# Patient Record
Sex: FEMALE | Race: BLACK OR AFRICAN AMERICAN | NOT HISPANIC OR LATINO | Employment: FULL TIME | URBAN - METROPOLITAN AREA
[De-identification: names, ages, dates, MRNs, and addresses within clinical notes are randomized per-mention and may not be internally consistent; named-entity substitution may affect disease eponyms.]

---

## 2017-05-10 ENCOUNTER — TELEPHONE (OUTPATIENT)
Dept: INTERNAL MEDICINE | Facility: CLINIC | Age: 52
End: 2017-05-10

## 2017-05-10 ENCOUNTER — PATIENT OUTREACH (OUTPATIENT)
Dept: ADMINISTRATIVE | Facility: HOSPITAL | Age: 52
End: 2017-05-10
Payer: COMMERCIAL

## 2017-05-10 DIAGNOSIS — I10 ESSENTIAL HYPERTENSION: Primary | Chronic | ICD-10-CM

## 2017-05-10 DIAGNOSIS — Z12.31 ENCOUNTER FOR SCREENING MAMMOGRAM FOR MALIGNANT NEOPLASM OF BREAST: ICD-10-CM

## 2017-05-10 NOTE — TELEPHONE ENCOUNTER
----- Message from Onel Bustos sent at 5/10/2017 11:37 AM CDT -----  Contact: jenae  Pt is returning the Nurse staff call. Pt call back [phone] thanks

## 2017-05-10 NOTE — PROGRESS NOTES
PATIENT CALLED BACK TO SCHEDULE PAP AND MAMMO. SHE WILL TALK TO DR. RAMIREZ ABOUT GETTING COLONOSCOPY PROCESS STARTED AT [facility] ON 5/22. SHE WOULD LIKE TO HAVE PAP SMEAR PERFORMED BY JAMES VELASQUEZ ON 5/22. MAMMO SCHEDULED AT [facility] ON 5/16 AT 2:30. APPOINTMENT SLIP MAILED TO PATIENT.

## 2017-05-10 NOTE — PROGRESS NOTES
PRE VISIT CHART AUDIT  COMPLETED. LABS ENTERED PER WOG.  CALL PLACED TO PATIENT NO ANSWER. LEFT VOICE MESSAGE.

## 2017-05-22 ENCOUNTER — LAB VISIT (OUTPATIENT)
Dept: LAB | Facility: HOSPITAL | Age: 52
End: 2017-05-22
Attending: FAMILY MEDICINE
Payer: COMMERCIAL

## 2017-05-22 ENCOUNTER — OFFICE VISIT (OUTPATIENT)
Dept: INTERNAL MEDICINE | Facility: CLINIC | Age: 52
End: 2017-05-22
Payer: COMMERCIAL

## 2017-05-22 VITALS
HEIGHT: 63 IN | HEART RATE: 74 BPM | DIASTOLIC BLOOD PRESSURE: 98 MMHG | SYSTOLIC BLOOD PRESSURE: 122 MMHG | BODY MASS INDEX: 35.12 KG/M2 | WEIGHT: 198.19 LBS | TEMPERATURE: 97 F

## 2017-05-22 DIAGNOSIS — I10 ESSENTIAL HYPERTENSION: Chronic | ICD-10-CM

## 2017-05-22 DIAGNOSIS — Z11.3 SCREENING EXAMINATION FOR STD (SEXUALLY TRANSMITTED DISEASE): ICD-10-CM

## 2017-05-22 DIAGNOSIS — Z12.4 CERVICAL CANCER SCREENING: ICD-10-CM

## 2017-05-22 DIAGNOSIS — Z12.11 COLON CANCER SCREENING: ICD-10-CM

## 2017-05-22 DIAGNOSIS — Z00.00 ANNUAL PHYSICAL EXAM: Primary | ICD-10-CM

## 2017-05-22 LAB
ALBUMIN SERPL BCP-MCNC: 3.5 G/DL
ALP SERPL-CCNC: 127 U/L
ALT SERPL W/O P-5'-P-CCNC: 22 U/L
ANION GAP SERPL CALC-SCNC: 7 MMOL/L
AST SERPL-CCNC: 25 U/L
BASOPHILS # BLD AUTO: 0.04 K/UL
BASOPHILS NFR BLD: 0.5 %
BILIRUB SERPL-MCNC: 0.4 MG/DL
BUN SERPL-MCNC: 16 MG/DL
CALCIUM SERPL-MCNC: 10.1 MG/DL
CHLORIDE SERPL-SCNC: 107 MMOL/L
CHOLEST/HDLC SERPL: 3.9 {RATIO}
CO2 SERPL-SCNC: 27 MMOL/L
CREAT SERPL-MCNC: 1.1 MG/DL
DIFFERENTIAL METHOD: ABNORMAL
EOSINOPHIL # BLD AUTO: 0.2 K/UL
EOSINOPHIL NFR BLD: 2.2 %
ERYTHROCYTE [DISTWIDTH] IN BLOOD BY AUTOMATED COUNT: 14.5 %
EST. GFR  (AFRICAN AMERICAN): >60 ML/MIN/1.73 M^2
EST. GFR  (NON AFRICAN AMERICAN): 57.9 ML/MIN/1.73 M^2
GLUCOSE SERPL-MCNC: 72 MG/DL
HCT VFR BLD AUTO: 38.3 %
HDL/CHOLESTEROL RATIO: 25.6 %
HDLC SERPL-MCNC: 160 MG/DL
HDLC SERPL-MCNC: 41 MG/DL
HGB BLD-MCNC: 12.1 G/DL
LDLC SERPL CALC-MCNC: 101.8 MG/DL
LYMPHOCYTES # BLD AUTO: 3.2 K/UL
LYMPHOCYTES NFR BLD: 38 %
MCH RBC QN AUTO: 29.1 PG
MCHC RBC AUTO-ENTMCNC: 31.6 %
MCV RBC AUTO: 92 FL
MONOCYTES # BLD AUTO: 0.4 K/UL
MONOCYTES NFR BLD: 4.6 %
NEUTROPHILS # BLD AUTO: 4.6 K/UL
NEUTROPHILS NFR BLD: 54.6 %
NONHDLC SERPL-MCNC: 119 MG/DL
PLATELET # BLD AUTO: 292 K/UL
PMV BLD AUTO: 10.3 FL
POTASSIUM SERPL-SCNC: 4.8 MMOL/L
PROT SERPL-MCNC: 8.4 G/DL
RBC # BLD AUTO: 4.16 M/UL
SODIUM SERPL-SCNC: 141 MMOL/L
TRIGL SERPL-MCNC: 86 MG/DL
TSH SERPL DL<=0.005 MIU/L-ACNC: 2.13 UIU/ML
WBC # BLD AUTO: 8.32 K/UL

## 2017-05-22 PROCEDURE — 80061 LIPID PANEL: CPT

## 2017-05-22 PROCEDURE — 36415 COLL VENOUS BLD VENIPUNCTURE: CPT | Mod: PO

## 2017-05-22 PROCEDURE — 3074F SYST BP LT 130 MM HG: CPT | Mod: S$GLB,,, | Performed by: FAMILY MEDICINE

## 2017-05-22 PROCEDURE — 3080F DIAST BP >= 90 MM HG: CPT | Mod: S$GLB,,, | Performed by: FAMILY MEDICINE

## 2017-05-22 PROCEDURE — 99396 PREV VISIT EST AGE 40-64: CPT | Mod: S$GLB,,, | Performed by: FAMILY MEDICINE

## 2017-05-22 PROCEDURE — 84443 ASSAY THYROID STIM HORMONE: CPT

## 2017-05-22 PROCEDURE — 80053 COMPREHEN METABOLIC PANEL: CPT

## 2017-05-22 PROCEDURE — 90715 TDAP VACCINE 7 YRS/> IM: CPT | Mod: S$GLB,,, | Performed by: FAMILY MEDICINE

## 2017-05-22 PROCEDURE — 85025 COMPLETE CBC W/AUTO DIFF WBC: CPT

## 2017-05-22 PROCEDURE — 86703 HIV-1/HIV-2 1 RESULT ANTBDY: CPT

## 2017-05-22 PROCEDURE — 88175 CYTOPATH C/V AUTO FLUID REDO: CPT

## 2017-05-22 PROCEDURE — 99999 PR PBB SHADOW E&M-EST. PATIENT-LVL III: CPT | Mod: PBBFAC,,, | Performed by: FAMILY MEDICINE

## 2017-05-22 PROCEDURE — 86592 SYPHILIS TEST NON-TREP QUAL: CPT

## 2017-05-22 PROCEDURE — 80074 ACUTE HEPATITIS PANEL: CPT

## 2017-05-22 PROCEDURE — 87591 N.GONORRHOEAE DNA AMP PROB: CPT

## 2017-05-22 PROCEDURE — 90471 IMMUNIZATION ADMIN: CPT | Mod: S$GLB,,, | Performed by: FAMILY MEDICINE

## 2017-05-22 RX ORDER — CYCLOBENZAPRINE HCL 10 MG
10 TABLET ORAL 3 TIMES DAILY PRN
Qty: 30 TABLET | Refills: 0 | Status: SHIPPED | OUTPATIENT
Start: 2017-05-22 | End: 2017-06-01

## 2017-05-22 RX ORDER — NAPROXEN 500 MG/1
500 TABLET ORAL 2 TIMES DAILY PRN
Qty: 45 TABLET | Refills: 0 | Status: SHIPPED | OUTPATIENT
Start: 2017-05-22 | End: 2018-03-07 | Stop reason: SDUPTHER

## 2017-05-22 RX ORDER — VALSARTAN AND HYDROCHLOROTHIAZIDE 320; 25 MG/1; MG/1
1 TABLET, FILM COATED ORAL DAILY
Qty: 30 TABLET | Refills: 11 | Status: SHIPPED | OUTPATIENT
Start: 2017-05-22 | End: 2018-02-26 | Stop reason: SDUPTHER
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
## 2017-05-23 LAB
C TRACH DNA SPEC QL NAA+PROBE: NOT DETECTED
HAV IGM SERPL QL IA: NEGATIVE
HBV CORE IGM SERPL QL IA: NEGATIVE
HBV SURFACE AG SERPL QL IA: NEGATIVE
HCV AB SERPL QL IA: NEGATIVE
HIV 1+2 AB+HIV1 P24 AG SERPL QL IA: NEGATIVE
N GONORRHOEA DNA SPEC QL NAA+PROBE: NOT DETECTED
RPR SER QL: NORMAL

## 2017-05-25 ENCOUNTER — TELEPHONE (OUTPATIENT)
Dept: INTERNAL MEDICINE | Facility: CLINIC | Age: 52
End: 2017-05-25

## 2017-05-25 RX ORDER — METRONIDAZOLE 500 MG/1
1000 TABLET ORAL EVERY 12 HOURS
Qty: 4 TABLET | Refills: 0 | Status: SHIPPED | OUTPATIENT
Start: 2017-05-25 | End: 2017-05-26

## 2017-05-25 NOTE — TELEPHONE ENCOUNTER
Pap is normal, but there is evidence of trichomonas present.  I want to treat this with Abx.  I will be sending it in.  Recommend she f/u in 4 wks and make sure it is one.  Avoid sexual activity until tthen.

## 2017-05-25 NOTE — TELEPHONE ENCOUNTER
----- Message from Xiang Asher MD sent at 5/23/2017  4:36 PM CDT -----  STD screening is normal  Overall labs are good/sugar/kidneys/liver function as well as thyroid and cholesterol.

## 2017-06-01 RX ORDER — SODIUM, POTASSIUM,MAG SULFATES 17.5-3.13G
SOLUTION, RECONSTITUTED, ORAL ORAL
Qty: 354 ML | Refills: 0 | Status: SHIPPED | OUTPATIENT
Start: 2017-06-01 | End: 2018-06-11

## 2018-02-26 RX ORDER — VALSARTAN AND HYDROCHLOROTHIAZIDE 320; 25 MG/1; MG/1
1 TABLET, FILM COATED ORAL DAILY
Qty: 30 TABLET | Refills: 3 | Status: SHIPPED | OUTPATIENT
Start: 2018-02-26 | End: 2018-03-07

## 2018-02-26 RX ORDER — VALSARTAN AND HYDROCHLOROTHIAZIDE 320; 25 MG/1; MG/1
TABLET, FILM COATED ORAL
Qty: 30 TABLET | Refills: 2 | Status: SHIPPED | OUTPATIENT
Start: 2018-02-26 | End: 2018-08-30 | Stop reason: RX

## 2018-03-07 ENCOUNTER — OFFICE VISIT (OUTPATIENT)
Dept: INTERNAL MEDICINE | Facility: CLINIC | Age: 53
End: 2018-03-07
Payer: COMMERCIAL

## 2018-03-07 VITALS
BODY MASS INDEX: 35.2 KG/M2 | DIASTOLIC BLOOD PRESSURE: 80 MMHG | SYSTOLIC BLOOD PRESSURE: 100 MMHG | HEIGHT: 63 IN | WEIGHT: 198.63 LBS | TEMPERATURE: 97 F | HEART RATE: 62 BPM

## 2018-03-07 DIAGNOSIS — M79.641 RIGHT HAND PAIN: ICD-10-CM

## 2018-03-07 DIAGNOSIS — M54.2 CERVICALGIA: Primary | ICD-10-CM

## 2018-03-07 DIAGNOSIS — Z29.9 PREVENTIVE MEASURE: ICD-10-CM

## 2018-03-07 DIAGNOSIS — Z12.11 COLON CANCER SCREENING: ICD-10-CM

## 2018-03-07 DIAGNOSIS — Z12.39 BREAST CANCER SCREENING: ICD-10-CM

## 2018-03-07 DIAGNOSIS — I10 HYPERTENSION, UNSPECIFIED TYPE: ICD-10-CM

## 2018-03-07 PROCEDURE — 3079F DIAST BP 80-89 MM HG: CPT | Mod: S$GLB,,, | Performed by: FAMILY MEDICINE

## 2018-03-07 PROCEDURE — 99999 PR PBB SHADOW E&M-EST. PATIENT-LVL III: CPT | Mod: PBBFAC,,, | Performed by: FAMILY MEDICINE

## 2018-03-07 PROCEDURE — 99214 OFFICE O/P EST MOD 30 MIN: CPT | Mod: S$GLB,,, | Performed by: FAMILY MEDICINE

## 2018-03-07 PROCEDURE — 3074F SYST BP LT 130 MM HG: CPT | Mod: S$GLB,,, | Performed by: FAMILY MEDICINE

## 2018-03-07 RX ORDER — TIZANIDINE 4 MG/1
4 TABLET ORAL 3 TIMES DAILY PRN
Qty: 20 TABLET | Refills: 0 | Status: SHIPPED | OUTPATIENT
Start: 2018-03-07 | End: 2018-03-17

## 2018-03-07 RX ORDER — NAPROXEN 500 MG/1
500 TABLET ORAL 2 TIMES DAILY PRN
Qty: 45 TABLET | Refills: 0 | Status: SHIPPED | OUTPATIENT
Start: 2018-03-07 | End: 2018-11-12
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
## 2018-03-12 ENCOUNTER — DOCUMENTATION ONLY (OUTPATIENT)
Dept: ENDOSCOPY | Facility: HOSPITAL | Age: 53
End: 2018-03-12

## 2018-04-02 ENCOUNTER — HOSPITAL ENCOUNTER (OUTPATIENT)
Dept: RADIOLOGY | Facility: HOSPITAL | Age: 53
Discharge: HOME | End: 2018-04-02
Attending: FAMILY MEDICINE
Payer: COMMERCIAL

## 2018-04-02 VITALS — HEIGHT: 63 IN | WEIGHT: 198 LBS | BODY MASS INDEX: 35.08 KG/M2

## 2018-04-02 DIAGNOSIS — Z12.39 BREAST CANCER SCREENING: ICD-10-CM

## 2018-04-02 PROCEDURE — 77063 BREAST TOMOSYNTHESIS BI: CPT | Mod: 26,,, | Performed by: RADIOLOGY

## 2018-04-02 PROCEDURE — 77067 SCR MAMMO BI INCL CAD: CPT | Mod: 26,,, | Performed by: RADIOLOGY

## 2018-04-02 PROCEDURE — 77067 SCR MAMMO BI INCL CAD: CPT | Mod: TC,PO

## 2018-06-04 ENCOUNTER — LAB VISIT (OUTPATIENT)
Dept: LAB | Facility: HOSPITAL | Age: 53
End: 2018-06-04
Attending: FAMILY MEDICINE
Payer: COMMERCIAL

## 2018-06-04 DIAGNOSIS — Z29.9 PREVENTIVE MEASURE: ICD-10-CM

## 2018-06-04 LAB
ALBUMIN SERPL BCP-MCNC: 3.4 G/DL
ALP SERPL-CCNC: 113 U/L
ALT SERPL W/O P-5'-P-CCNC: 19 U/L
ANION GAP SERPL CALC-SCNC: 10 MMOL/L
AST SERPL-CCNC: 20 U/L
BASOPHILS # BLD AUTO: 0.05 K/UL
BASOPHILS NFR BLD: 0.7 %
BILIRUB SERPL-MCNC: 0.4 MG/DL
BUN SERPL-MCNC: 17 MG/DL
CALCIUM SERPL-MCNC: 9.6 MG/DL
CHLORIDE SERPL-SCNC: 107 MMOL/L
CHOLEST SERPL-MCNC: 156 MG/DL
CHOLEST/HDLC SERPL: 4.2 {RATIO}
CO2 SERPL-SCNC: 26 MMOL/L
CREAT SERPL-MCNC: 1.3 MG/DL
DIFFERENTIAL METHOD: ABNORMAL
EOSINOPHIL # BLD AUTO: 0.2 K/UL
EOSINOPHIL NFR BLD: 2.3 %
ERYTHROCYTE [DISTWIDTH] IN BLOOD BY AUTOMATED COUNT: 14.4 %
EST. GFR  (AFRICAN AMERICAN): 54.1 ML/MIN/1.73 M^2
EST. GFR  (NON AFRICAN AMERICAN): 47 ML/MIN/1.73 M^2
ESTIMATED AVG GLUCOSE: 126 MG/DL
GLUCOSE SERPL-MCNC: 143 MG/DL
HBA1C MFR BLD HPLC: 6 %
HCT VFR BLD AUTO: 35.4 %
HDLC SERPL-MCNC: 37 MG/DL
HDLC SERPL: 23.7 %
HGB BLD-MCNC: 11 G/DL
IMM GRANULOCYTES # BLD AUTO: 0.05 K/UL
IMM GRANULOCYTES NFR BLD AUTO: 0.7 %
LDLC SERPL CALC-MCNC: 103.4 MG/DL
LYMPHOCYTES # BLD AUTO: 2.6 K/UL
LYMPHOCYTES NFR BLD: 34 %
MCH RBC QN AUTO: 30.5 PG
MCHC RBC AUTO-ENTMCNC: 31.1 G/DL
MCV RBC AUTO: 98 FL
MONOCYTES # BLD AUTO: 0.4 K/UL
MONOCYTES NFR BLD: 5.3 %
NEUTROPHILS # BLD AUTO: 4.3 K/UL
NEUTROPHILS NFR BLD: 57 %
NONHDLC SERPL-MCNC: 119 MG/DL
NRBC BLD-RTO: 0 /100 WBC
PLATELET # BLD AUTO: 258 K/UL
PMV BLD AUTO: 10.6 FL
POTASSIUM SERPL-SCNC: 4.4 MMOL/L
PROT SERPL-MCNC: 7.6 G/DL
RBC # BLD AUTO: 3.61 M/UL
SODIUM SERPL-SCNC: 143 MMOL/L
TRIGL SERPL-MCNC: 78 MG/DL
TSH SERPL DL<=0.005 MIU/L-ACNC: 1.68 UIU/ML
WBC # BLD AUTO: 7.49 K/UL

## 2018-06-04 PROCEDURE — 80053 COMPREHEN METABOLIC PANEL: CPT

## 2018-06-04 PROCEDURE — 36415 COLL VENOUS BLD VENIPUNCTURE: CPT | Mod: PO

## 2018-06-04 PROCEDURE — 84443 ASSAY THYROID STIM HORMONE: CPT

## 2018-06-04 PROCEDURE — 83036 HEMOGLOBIN GLYCOSYLATED A1C: CPT

## 2018-06-04 PROCEDURE — 80074 ACUTE HEPATITIS PANEL: CPT

## 2018-06-04 PROCEDURE — 86592 SYPHILIS TEST NON-TREP QUAL: CPT

## 2018-06-04 PROCEDURE — 85025 COMPLETE CBC W/AUTO DIFF WBC: CPT

## 2018-06-04 PROCEDURE — 80061 LIPID PANEL: CPT

## 2018-06-04 PROCEDURE — 86703 HIV-1/HIV-2 1 RESULT ANTBDY: CPT

## 2018-06-05 LAB
HAV IGM SERPL QL IA: NEGATIVE
HBV CORE IGM SERPL QL IA: NEGATIVE
HBV SURFACE AG SERPL QL IA: NEGATIVE
HCV AB SERPL QL IA: NEGATIVE
HIV 1+2 AB+HIV1 P24 AG SERPL QL IA: NEGATIVE
RPR SER QL: NORMAL

## 2018-06-11 ENCOUNTER — LAB VISIT (OUTPATIENT)
Dept: LAB | Facility: HOSPITAL | Age: 53
End: 2018-06-11
Attending: FAMILY MEDICINE
Payer: COMMERCIAL

## 2018-06-11 ENCOUNTER — OFFICE VISIT (OUTPATIENT)
Dept: INTERNAL MEDICINE | Facility: CLINIC | Age: 53
End: 2018-06-11
Payer: COMMERCIAL

## 2018-06-11 VITALS
TEMPERATURE: 99 F | SYSTOLIC BLOOD PRESSURE: 116 MMHG | WEIGHT: 207.44 LBS | HEART RATE: 72 BPM | DIASTOLIC BLOOD PRESSURE: 80 MMHG | BODY MASS INDEX: 36.75 KG/M2 | HEIGHT: 63 IN

## 2018-06-11 DIAGNOSIS — R73.09 ABNORMAL GLUCOSE: ICD-10-CM

## 2018-06-11 DIAGNOSIS — Z12.11 COLON CANCER SCREENING: ICD-10-CM

## 2018-06-11 DIAGNOSIS — D64.9 ANEMIA, UNSPECIFIED TYPE: ICD-10-CM

## 2018-06-11 DIAGNOSIS — I10 HYPERTENSION, UNSPECIFIED TYPE: ICD-10-CM

## 2018-06-11 DIAGNOSIS — Z00.00 ANNUAL PHYSICAL EXAM: Primary | ICD-10-CM

## 2018-06-11 LAB
BASOPHILS # BLD AUTO: 0.06 K/UL
BASOPHILS NFR BLD: 0.7 %
DIFFERENTIAL METHOD: ABNORMAL
EOSINOPHIL # BLD AUTO: 0.2 K/UL
EOSINOPHIL NFR BLD: 2.4 %
ERYTHROCYTE [DISTWIDTH] IN BLOOD BY AUTOMATED COUNT: 14.1 %
FERRITIN SERPL-MCNC: 139 NG/ML
HCT VFR BLD AUTO: 35.2 %
HGB BLD-MCNC: 10.7 G/DL
IMM GRANULOCYTES # BLD AUTO: 0.02 K/UL
IMM GRANULOCYTES NFR BLD AUTO: 0.2 %
IRON SERPL-MCNC: 58 UG/DL
LYMPHOCYTES # BLD AUTO: 2.9 K/UL
LYMPHOCYTES NFR BLD: 35.3 %
MCH RBC QN AUTO: 30.1 PG
MCHC RBC AUTO-ENTMCNC: 30.4 G/DL
MCV RBC AUTO: 99 FL
MONOCYTES # BLD AUTO: 0.6 K/UL
MONOCYTES NFR BLD: 7.1 %
NEUTROPHILS # BLD AUTO: 4.4 K/UL
NEUTROPHILS NFR BLD: 54.3 %
NRBC BLD-RTO: 0 /100 WBC
PLATELET # BLD AUTO: 265 K/UL
PMV BLD AUTO: 9.8 FL
RBC # BLD AUTO: 3.56 M/UL
SATURATED IRON: 18 %
TOTAL IRON BINDING CAPACITY: 330 UG/DL
TRANSFERRIN SERPL-MCNC: 223 MG/DL
WBC # BLD AUTO: 8.08 K/UL

## 2018-06-11 PROCEDURE — 3079F DIAST BP 80-89 MM HG: CPT | Mod: CPTII,S$GLB,, | Performed by: FAMILY MEDICINE

## 2018-06-11 PROCEDURE — 3074F SYST BP LT 130 MM HG: CPT | Mod: CPTII,S$GLB,, | Performed by: FAMILY MEDICINE

## 2018-06-11 PROCEDURE — 85025 COMPLETE CBC W/AUTO DIFF WBC: CPT

## 2018-06-11 PROCEDURE — 83540 ASSAY OF IRON: CPT

## 2018-06-11 PROCEDURE — 82728 ASSAY OF FERRITIN: CPT

## 2018-06-11 PROCEDURE — 36415 COLL VENOUS BLD VENIPUNCTURE: CPT | Mod: PO

## 2018-06-11 PROCEDURE — 99396 PREV VISIT EST AGE 40-64: CPT | Mod: S$GLB,,, | Performed by: FAMILY MEDICINE

## 2018-06-11 PROCEDURE — 99999 PR PBB SHADOW E&M-EST. PATIENT-LVL III: CPT | Mod: PBBFAC,,, | Performed by: FAMILY MEDICINE

## 2018-06-11 NOTE — PROGRESS NOTES
"Subjective:      Patient ID: Kassy Benjamin is a 52 y.o. female.    Chief Complaint: Annual Exam    HPI  53 yo female here for annual visit.  She didn't do PT for her neck/back, planning to get to it.  Has not done colonoscopy  Not eating well/exercising.  Taking her BP med daily    Past Medical History:   Diagnosis Date    B12 deficiency anemia     Cervical radiculopathy     Goiter     Hyperlipidemia     Hypertension      Family History   Problem Relation Age of Onset    Hypertension Son     Colon cancer Mother 65    Diabetes Father     Breast cancer Neg Hx      Past Surgical History:   Procedure Laterality Date    BREAST BIOPSY Right     3/2014 at [facility]//neg     Social History   Substance Use Topics    Smoking status: Never Smoker    Smokeless tobacco: Not on file    Alcohol use No       /80 (BP Location: Left arm, Patient Position: Sitting, BP Method: Large (Manual))   Pulse 72   Temp 98.9 °F (37.2 °C) (Tympanic)   Ht 5' 3" (1.6 m)   Wt 94.1 kg (207 lb 7.3 oz)   LMP 07/25/2013   BMI 36.75 kg/m²     Review of Systems   Constitutional: Positive for activity change. Negative for appetite change, chills, diaphoresis, fatigue, fever and unexpected weight change.   HENT: Negative for ear pain, hearing loss, postnasal drip, rhinorrhea and tinnitus.    Eyes: Negative for visual disturbance.   Respiratory: Negative for cough, shortness of breath and wheezing.    Cardiovascular: Negative for chest pain, palpitations and leg swelling.   Gastrointestinal: Negative for abdominal distention, abdominal pain, constipation and diarrhea.   Genitourinary: Negative for dysuria, frequency, hematuria and urgency.   Musculoskeletal: Positive for neck pain. Negative for back pain and joint swelling.   Neurological: Negative for weakness and headaches.   Hematological: Negative for adenopathy.   Psychiatric/Behavioral: Negative for confusion and decreased concentration.       Objective:     Physical " Exam   Constitutional: She is oriented to person, place, and time. She appears well-developed and well-nourished. No distress.   HENT:   Right Ear: External ear normal.   Left Ear: External ear normal.   Nose: Nose normal.   Mouth/Throat: Oropharynx is clear and moist.   Eyes: Conjunctivae are normal. Pupils are equal, round, and reactive to light.   Neck: Normal range of motion. Neck supple. Carotid bruit is not present.   Cardiovascular: Normal rate, regular rhythm and normal heart sounds.    No murmur heard.  Pulmonary/Chest: Effort normal and breath sounds normal. No respiratory distress. She has no wheezes. She has no rales.   Abdominal: Soft. Bowel sounds are normal. She exhibits no distension. There is no tenderness. There is no guarding.   Musculoskeletal: She exhibits no edema.   Neurological: She is alert and oriented to person, place, and time. No cranial nerve deficit.   Skin: Skin is warm and dry. No rash noted.   Psychiatric: She has a normal mood and affect. Her behavior is normal. Judgment and thought content normal.   Nursing note and vitals reviewed.      Lab Results   Component Value Date    WBC 7.49 06/04/2018    HGB 11.0 (L) 06/04/2018    HCT 35.4 (L) 06/04/2018     06/04/2018    CHOL 156 06/04/2018    TRIG 78 06/04/2018    HDL 37 (L) 06/04/2018    ALT 19 06/04/2018    AST 20 06/04/2018     06/04/2018    K 4.4 06/04/2018     06/04/2018    CREATININE 1.3 06/04/2018    BUN 17 06/04/2018    CO2 26 06/04/2018    TSH 1.682 06/04/2018    HGBA1C 6.0 (H) 06/04/2018       Assessment:     1. Annual physical exam    2. Colon cancer screening    3. Anemia, unspecified type    4. Abnormal glucose    5. Hypertension, unspecified type         Plan:     Annual physical exam    Colon cancer screening  -     Case request GI: COLONOSCOPY    Anemia, unspecified type  -     CBC auto differential; Future; Expected date: 06/11/2018  -     Ferritin; Future; Expected date: 06/11/2018  -     Iron and  TIBC; Future; Expected date: 06/11/2018    Abnormal glucose  -     Hemoglobin A1c; Future; Expected date: 12/11/2018    Hypertension, unspecified type  -     Comprehensive metabolic panel; Future; Expected date: 12/11/2018    Reviewed labs with pt  Sugar is up  Focus on better diet/exericse/weight loss.    PT for neck  Update colonoscopy  BP is stable, cont current med  F/u annually and PRN

## 2018-06-13 ENCOUNTER — TELEPHONE (OUTPATIENT)
Dept: INTERNAL MEDICINE | Facility: CLINIC | Age: 53
End: 2018-06-13

## 2018-06-13 DIAGNOSIS — D75.89 MACROCYTOSIS: ICD-10-CM

## 2018-06-13 DIAGNOSIS — D64.9 ANEMIA, UNSPECIFIED TYPE: Primary | ICD-10-CM

## 2018-06-13 NOTE — TELEPHONE ENCOUNTER
Labs are stable but we didn't get a B12.  Has pt been taking any B12 supplements?  She has been deficient before and we may need to restart replacement.  I need a B12 level though, order in

## 2018-06-13 NOTE — TELEPHONE ENCOUNTER
Pt notified. She said, she hasn't been taking any B12 supplements. Lab appt scheduled for 6/18 at [facility].

## 2018-06-18 ENCOUNTER — LAB VISIT (OUTPATIENT)
Dept: LAB | Facility: HOSPITAL | Age: 53
End: 2018-06-18
Attending: FAMILY MEDICINE
Payer: COMMERCIAL

## 2018-06-18 DIAGNOSIS — D64.9 ANEMIA, UNSPECIFIED TYPE: ICD-10-CM

## 2018-06-18 DIAGNOSIS — D75.89 MACROCYTOSIS: ICD-10-CM

## 2018-06-18 LAB — VIT B12 SERPL-MCNC: 253 PG/ML

## 2018-06-18 PROCEDURE — 36415 COLL VENOUS BLD VENIPUNCTURE: CPT | Mod: PO

## 2018-06-18 PROCEDURE — 82607 VITAMIN B-12: CPT

## 2018-06-19 ENCOUNTER — TELEPHONE (OUTPATIENT)
Dept: INTERNAL MEDICINE | Facility: CLINIC | Age: 53
End: 2018-06-19

## 2018-06-19 DIAGNOSIS — E53.8 B12 DEFICIENCY: ICD-10-CM

## 2018-06-19 DIAGNOSIS — D64.9 ANEMIA, UNSPECIFIED TYPE: Primary | ICD-10-CM

## 2018-06-19 RX ORDER — CYANOCOBALAMIN 1000 UG/ML
100 INJECTION, SOLUTION INTRAMUSCULAR; SUBCUTANEOUS
Status: DISCONTINUED | OUTPATIENT
Start: 2018-06-25 | End: 2018-08-06

## 2018-06-19 NOTE — TELEPHONE ENCOUNTER
Pt called back , let her know that Dr. Asher wants to recheck her labs in 12 weeks.  Booked for 9-10-18 at [facility] location non fasting labs.    Dr. Asher said that she must do her colonoscopy before the lab appt 9-10-18.  She said she will call them to get set up.

## 2018-06-19 NOTE — TELEPHONE ENCOUNTER
Notified pt of results and recommendations. She verbalized understanding. She said, she will come in clinic for injections. Pt has scheduled appt for first injection on 6/25. Please enter order

## 2018-06-19 NOTE — TELEPHONE ENCOUNTER
----- Message from Xiang Asher MD sent at 6/19/2018  7:54 AM CDT -----  B12 is getting low again.  Does pt want to start monthly injections with the nurse, or can she do them on her own.

## 2018-06-22 ENCOUNTER — DOCUMENTATION ONLY (OUTPATIENT)
Dept: ENDOSCOPY | Facility: HOSPITAL | Age: 53
End: 2018-06-22

## 2018-06-25 ENCOUNTER — CLINICAL SUPPORT (OUTPATIENT)
Dept: INTERNAL MEDICINE | Facility: CLINIC | Age: 53
End: 2018-06-25
Payer: COMMERCIAL

## 2018-06-25 ENCOUNTER — TELEPHONE (OUTPATIENT)
Dept: INTERNAL MEDICINE | Facility: CLINIC | Age: 53
End: 2018-06-25

## 2018-06-25 DIAGNOSIS — E53.8 B12 DEFICIENCY: Primary | ICD-10-CM

## 2018-06-25 PROCEDURE — 96372 THER/PROPH/DIAG INJ SC/IM: CPT | Mod: S$GLB,,, | Performed by: FAMILY MEDICINE

## 2018-06-25 RX ADMIN — CYANOCOBALAMIN 100 MCG: 1000 INJECTION, SOLUTION INTRAMUSCULAR; SUBCUTANEOUS at 09:06

## 2018-06-25 NOTE — PROGRESS NOTES
B12 administered. Pt tolerated well. Advised pt to remain in clinic/lobby 15 minutes after injection. If no adverse/allergic reaction, may leave.

## 2018-06-25 NOTE — TELEPHONE ENCOUNTER
Pt complains of urinary incontinence whenever she laughs or sneezes. She wears liners, but she has to change them very often. She inquired about a bladder lift and if this is something that she could possibly have done? Who does she need to see?

## 2018-06-26 NOTE — TELEPHONE ENCOUNTER
Called and let pt know that Dr. Asher said she shouldn't need a referral, and can call and schedule her appt.  If they tell you that they need one she can place one for her.    I gave her Dr. Jt Shaw and Dr. Cristobal Dennis names that are listed as urogyn.  Ph # [phone]

## 2018-06-26 NOTE — TELEPHONE ENCOUNTER
She can call to make appt, shouldn't need referral there.  Recommend Uro/gyn, she can call to get name and if they require referral we can place one.

## 2018-07-23 ENCOUNTER — TELEPHONE (OUTPATIENT)
Dept: INTERNAL MEDICINE | Facility: CLINIC | Age: 53
End: 2018-07-23

## 2018-07-23 NOTE — TELEPHONE ENCOUNTER
----- Message from Aury Woods sent at 7/23/2018  7:59 AM CDT -----  Contact: self [phone]  States that she needs to reschedule her nurse visit appt for her B12 injection. Please call back at [phone]//thank you acc

## 2018-08-06 ENCOUNTER — TELEPHONE (OUTPATIENT)
Dept: INTERNAL MEDICINE | Facility: CLINIC | Age: 53
End: 2018-08-06

## 2018-08-06 RX ORDER — CYANOCOBALAMIN 1000 UG/ML
1000 INJECTION, SOLUTION INTRAMUSCULAR; SUBCUTANEOUS
Status: DISCONTINUED | OUTPATIENT
Start: 2018-08-07 | End: 2018-08-13

## 2018-08-06 NOTE — TELEPHONE ENCOUNTER
----- Message from Ayaka Jordan sent at 8/6/2018  1:00 PM CDT -----  Contact: Pt  Pt called and stated she needs to schedule a nurse's visit for a B12 shot. She can be reached at [phone].    Thanks,  TF

## 2018-08-13 ENCOUNTER — TELEPHONE (OUTPATIENT)
Dept: INTERNAL MEDICINE | Facility: CLINIC | Age: 53
End: 2018-08-13

## 2018-08-13 RX ORDER — CYANOCOBALAMIN 1000 UG/ML
100 INJECTION, SOLUTION INTRAMUSCULAR; SUBCUTANEOUS ONCE
Status: COMPLETED | OUTPATIENT
Start: 2018-08-20 | End: 2018-08-20

## 2018-08-13 NOTE — TELEPHONE ENCOUNTER
----- Message from Fadia Cullen sent at 8/13/2018 11:47 AM CDT -----  Contact: Pt   Pt request call back to cancel her Nurse Visit appointment. .[phone] (home)

## 2018-08-13 NOTE — TELEPHONE ENCOUNTER
----- Message from Alesha Villalobos sent at 8/13/2018 12:07 PM CDT -----  Contact: pt  The pt states she is returning a missed call, can be reached at [phone]///thxMW

## 2018-08-13 NOTE — TELEPHONE ENCOUNTER
Pt had to reschedule 2 nurse visits for B 12 injections. She rescheduled for 8/20/18. She is past due. Last B12 injections was 6/23/18. Can a one time order be entered to give injection on 8/20?

## 2018-08-20 ENCOUNTER — CLINICAL SUPPORT (OUTPATIENT)
Dept: INTERNAL MEDICINE | Facility: CLINIC | Age: 53
End: 2018-08-20
Payer: COMMERCIAL

## 2018-08-20 DIAGNOSIS — E53.8 B12 DEFICIENCY: Primary | ICD-10-CM

## 2018-08-20 PROCEDURE — 96372 THER/PROPH/DIAG INJ SC/IM: CPT | Mod: S$GLB,,, | Performed by: FAMILY MEDICINE

## 2018-08-20 RX ADMIN — CYANOCOBALAMIN 100 MCG: 1000 INJECTION, SOLUTION INTRAMUSCULAR; SUBCUTANEOUS at 11:08

## 2018-08-20 NOTE — PROGRESS NOTES
B12 administered. Pt tolerated well. Advised pt to remain in lobby 15 minutes after injection. If no adverse/allergic reaction, may leave

## 2018-08-30 RX ORDER — LOSARTAN POTASSIUM AND HYDROCHLOROTHIAZIDE 25; 100 MG/1; MG/1
1 TABLET ORAL DAILY
Qty: 90 TABLET | Refills: 3 | Status: SHIPPED | OUTPATIENT
Start: 2018-08-30 | End: 2018-08-31 | Stop reason: SDUPTHER

## 2018-08-30 NOTE — TELEPHONE ENCOUNTER
----- Message from Bronson Gaspar sent at 8/30/2018 10:47 AM CDT -----  Contact: pt  Pt is requesting a call back from the nurse in regards to her blood pressure med being on recall list and her needing some new med.  [phone] (home)

## 2018-08-30 NOTE — TELEPHONE ENCOUNTER
Pt called regarding valsartan-hydrochlorothiazide being on recall list and would like a new medication sent to the pharmacy.

## 2018-08-31 RX ORDER — LOSARTAN POTASSIUM AND HYDROCHLOROTHIAZIDE 25; 100 MG/1; MG/1
1 TABLET ORAL DAILY
Qty: 90 TABLET | Refills: 3 | Status: SHIPPED | OUTPATIENT
Start: 2018-08-31 | End: 2018-11-12 | Stop reason: SINTOL

## 2018-08-31 NOTE — TELEPHONE ENCOUNTER
Notified pt that  changed to losartan-hctz 100-25 mg. Advised pt to monitor bp. She verbalized understanding. She would like prescription sent to [facility] on [address]. She no longer uses [facility].

## 2018-09-10 ENCOUNTER — LAB VISIT (OUTPATIENT)
Dept: LAB | Facility: HOSPITAL | Age: 53
End: 2018-09-10
Attending: FAMILY MEDICINE
Payer: COMMERCIAL

## 2018-09-10 DIAGNOSIS — E53.8 B12 DEFICIENCY: ICD-10-CM

## 2018-09-10 DIAGNOSIS — D64.9 ANEMIA, UNSPECIFIED TYPE: ICD-10-CM

## 2018-09-10 LAB
BASOPHILS # BLD AUTO: 0.08 K/UL
BASOPHILS NFR BLD: 0.9 %
DIFFERENTIAL METHOD: ABNORMAL
EOSINOPHIL # BLD AUTO: 0.2 K/UL
EOSINOPHIL NFR BLD: 2.1 %
ERYTHROCYTE [DISTWIDTH] IN BLOOD BY AUTOMATED COUNT: 13.7 %
HCT VFR BLD AUTO: 40.9 %
HGB BLD-MCNC: 12.4 G/DL
IMM GRANULOCYTES # BLD AUTO: 0.05 K/UL
IMM GRANULOCYTES NFR BLD AUTO: 0.6 %
LYMPHOCYTES # BLD AUTO: 3.2 K/UL
LYMPHOCYTES NFR BLD: 37 %
MCH RBC QN AUTO: 29.6 PG
MCHC RBC AUTO-ENTMCNC: 30.3 G/DL
MCV RBC AUTO: 98 FL
MONOCYTES # BLD AUTO: 0.6 K/UL
MONOCYTES NFR BLD: 7.2 %
NEUTROPHILS # BLD AUTO: 4.5 K/UL
NEUTROPHILS NFR BLD: 52.2 %
NRBC BLD-RTO: 0 /100 WBC
PLATELET # BLD AUTO: 288 K/UL
PMV BLD AUTO: 10.3 FL
RBC # BLD AUTO: 4.19 M/UL
VIT B12 SERPL-MCNC: 486 PG/ML
WBC # BLD AUTO: 8.53 K/UL

## 2018-09-10 PROCEDURE — 82607 VITAMIN B-12: CPT

## 2018-09-10 PROCEDURE — 83540 ASSAY OF IRON: CPT

## 2018-09-10 PROCEDURE — 36415 COLL VENOUS BLD VENIPUNCTURE: CPT | Mod: PO

## 2018-09-10 PROCEDURE — 85025 COMPLETE CBC W/AUTO DIFF WBC: CPT

## 2018-09-10 PROCEDURE — 82728 ASSAY OF FERRITIN: CPT

## 2018-09-11 LAB
FERRITIN SERPL-MCNC: 116 NG/ML
IRON SERPL-MCNC: 44 UG/DL
SATURATED IRON: 13 %
TOTAL IRON BINDING CAPACITY: 345 UG/DL
TRANSFERRIN SERPL-MCNC: 233 MG/DL

## 2018-09-12 ENCOUNTER — TELEPHONE (OUTPATIENT)
Dept: INTERNAL MEDICINE | Facility: CLINIC | Age: 53
End: 2018-09-12

## 2018-09-12 NOTE — TELEPHONE ENCOUNTER
Notified pt of results and recommendation. She verbalized understanding. She said, she has been taking the new blood pressure medicine and has a mild headache everyday. Pt has not been monitoring her bp. Advised pt to check bp and let us know what the reading is. Explained to her that if her bp is high, that may cause a headache also. She verbalized understanding and will call back with reading.

## 2018-09-12 NOTE — TELEPHONE ENCOUNTER
----- Message from Xiang Asher MD sent at 9/11/2018  4:46 PM CDT -----  Labs improving, cont with B12 shots monthly

## 2018-09-24 ENCOUNTER — TELEPHONE (OUTPATIENT)
Dept: INTERNAL MEDICINE | Facility: CLINIC | Age: 53
End: 2018-09-24

## 2018-09-24 NOTE — TELEPHONE ENCOUNTER
----- Message from Shauna Marin sent at 9/24/2018  8:15 AM CDT -----  Contact: pt  Please call pt @ [phone], please cancel pt nurse appt for today, pt will call later to reschedule.

## 2018-09-28 ENCOUNTER — TELEPHONE (OUTPATIENT)
Dept: INTERNAL MEDICINE | Facility: CLINIC | Age: 53
End: 2018-09-28

## 2018-09-28 NOTE — TELEPHONE ENCOUNTER
----- Message from Bouchra Lerner sent at 9/28/2018  9:23 AM CDT -----  Contact: pt  Pt stated she will like a call back, she can be reached at [phone] Thanks

## 2018-09-28 NOTE — TELEPHONE ENCOUNTER
Pt complains of pelvic pain, especially when she walks. She wants to know what it is. Informed pt that she will need to be evaluated. She verbalized and requested to schedule an appt for Tuesday. appt scheduled for 10/2/18 with Neha Shen

## 2018-11-12 ENCOUNTER — OFFICE VISIT (OUTPATIENT)
Dept: INTERNAL MEDICINE | Facility: CLINIC | Age: 53
End: 2018-11-12
Payer: COMMERCIAL

## 2018-11-12 ENCOUNTER — HOSPITAL ENCOUNTER (OUTPATIENT)
Dept: RADIOLOGY | Facility: HOSPITAL | Age: 53
Discharge: HOME | End: 2018-11-12
Attending: FAMILY MEDICINE
Payer: COMMERCIAL

## 2018-11-12 VITALS
HEIGHT: 63 IN | SYSTOLIC BLOOD PRESSURE: 158 MMHG | HEART RATE: 58 BPM | BODY MASS INDEX: 37.39 KG/M2 | DIASTOLIC BLOOD PRESSURE: 90 MMHG | WEIGHT: 211 LBS | TEMPERATURE: 98 F

## 2018-11-12 DIAGNOSIS — G89.29 CHRONIC PAIN OF RIGHT KNEE: ICD-10-CM

## 2018-11-12 DIAGNOSIS — M25.561 CHRONIC PAIN OF RIGHT KNEE: ICD-10-CM

## 2018-11-12 DIAGNOSIS — G89.29 CHRONIC PAIN OF RIGHT KNEE: Primary | ICD-10-CM

## 2018-11-12 DIAGNOSIS — M54.2 CERVICALGIA: ICD-10-CM

## 2018-11-12 DIAGNOSIS — Z12.11 COLON CANCER SCREENING: ICD-10-CM

## 2018-11-12 DIAGNOSIS — I10 HYPERTENSION, UNSPECIFIED TYPE: ICD-10-CM

## 2018-11-12 DIAGNOSIS — M25.561 CHRONIC PAIN OF RIGHT KNEE: Primary | ICD-10-CM

## 2018-11-12 PROCEDURE — 73562 X-RAY EXAM OF KNEE 3: CPT | Mod: TC,FY,PO,RT

## 2018-11-12 PROCEDURE — 99214 OFFICE O/P EST MOD 30 MIN: CPT | Mod: 25,S$GLB,, | Performed by: FAMILY MEDICINE

## 2018-11-12 PROCEDURE — 3008F BODY MASS INDEX DOCD: CPT | Mod: CPTII,S$GLB,, | Performed by: FAMILY MEDICINE

## 2018-11-12 PROCEDURE — 3080F DIAST BP >= 90 MM HG: CPT | Mod: CPTII,S$GLB,, | Performed by: FAMILY MEDICINE

## 2018-11-12 PROCEDURE — 99999 PR PBB SHADOW E&M-EST. PATIENT-LVL III: CPT | Mod: PBBFAC,,, | Performed by: FAMILY MEDICINE

## 2018-11-12 PROCEDURE — 73562 X-RAY EXAM OF KNEE 3: CPT | Mod: 26,RT,, | Performed by: RADIOLOGY

## 2018-11-12 PROCEDURE — 3077F SYST BP >= 140 MM HG: CPT | Mod: CPTII,S$GLB,, | Performed by: FAMILY MEDICINE

## 2018-11-12 PROCEDURE — 73560 X-RAY EXAM OF KNEE 1 OR 2: CPT | Mod: TC,FY,PO,LT

## 2018-11-12 PROCEDURE — 73560 X-RAY EXAM OF KNEE 1 OR 2: CPT | Mod: 26,XS,LT, | Performed by: RADIOLOGY

## 2018-11-12 PROCEDURE — 96372 THER/PROPH/DIAG INJ SC/IM: CPT | Mod: S$GLB,,, | Performed by: FAMILY MEDICINE

## 2018-11-12 RX ORDER — MELOXICAM 15 MG/1
15 TABLET ORAL DAILY PRN
Qty: 30 TABLET | Refills: 3 | Status: SHIPPED | OUTPATIENT
Start: 2018-11-12 | End: 2019-04-02 | Stop reason: SDUPTHER

## 2018-11-12 RX ORDER — CYANOCOBALAMIN 1000 UG/ML
1000 INJECTION, SOLUTION INTRAMUSCULAR; SUBCUTANEOUS
Status: DISCONTINUED | OUTPATIENT
Start: 2018-11-12 | End: 2018-12-31

## 2018-11-12 RX ORDER — OLMESARTAN MEDOXOMIL AND HYDROCHLOROTHIAZIDE 40/25 40; 25 MG/1; MG/1
1 TABLET ORAL DAILY
Qty: 90 TABLET | Refills: 3 | Status: SHIPPED | OUTPATIENT
Start: 2018-11-12 | End: 2020-02-11

## 2018-11-12 RX ADMIN — CYANOCOBALAMIN 1000 MCG: 1000 INJECTION, SOLUTION INTRAMUSCULAR; SUBCUTANEOUS at 11:11

## 2018-11-12 NOTE — PROGRESS NOTES
"Subjective:      Patient ID: Kassy Benjamin is a 53 y.o. female.    Chief Complaint: Knee Pain; Arm Pain; and Shoulder Pain    HPI  52 yo female here for a few issues.  Her BP med was recalled, so we changed to Losartan/HCTZ.  This caused her ill SE.  So not taking the BP med for past several weeks.  She has R knee pain on medial side.  No trauma/injury.  Happened after walking up/down stairs.  She has chronic neck pain and pain into the L shoulder/arm.  Has not done PT yet, although we keep recommending it.  She is willing to do it.    Past Medical History:   Diagnosis Date    B12 deficiency anemia     Cervical radiculopathy     Goiter     Hyperlipidemia     Hypertension      Family History   Problem Relation Age of Onset    Hypertension Son     Colon cancer Mother 65    Diabetes Father     Breast cancer Neg Hx      Past Surgical History:   Procedure Laterality Date    BREAST BIOPSY Right     3/2014 at [facility]//neg     Social History     Tobacco Use    Smoking status: Never Smoker   Substance Use Topics    Alcohol use: No    Drug use: No       BP (!) 158/90   Pulse (!) 58   Temp 97.7 °F (36.5 °C) (Tympanic)   Ht 5' 3" (1.6 m)   Wt 95.7 kg (210 lb 15.7 oz)   LMP 07/25/2013   BMI 37.37 kg/m²     Review of Systems   Constitutional: Positive for activity change.   Musculoskeletal: Positive for arthralgias.       Objective:     Physical Exam   Constitutional: She is oriented to person, place, and time. She appears well-developed and well-nourished.   Cardiovascular: Normal rate, regular rhythm and normal heart sounds.   Pulmonary/Chest: Effort normal and breath sounds normal. No stridor. No respiratory distress.   Musculoskeletal: She exhibits no edema.        Right knee: She exhibits normal range of motion and no swelling. Tenderness found. Medial joint line tenderness noted.        Cervical back: She exhibits tenderness. She exhibits normal range of motion.   Neurological: She is alert and " oriented to person, place, and time.   Psychiatric: She has a normal mood and affect. Her behavior is normal. Judgment and thought content normal.   Nursing note and vitals reviewed.      Lab Results   Component Value Date    WBC 8.53 09/10/2018    HGB 12.4 09/10/2018    HCT 40.9 09/10/2018     09/10/2018    CHOL 156 06/04/2018    TRIG 78 06/04/2018    HDL 37 (L) 06/04/2018    ALT 19 06/04/2018    AST 20 06/04/2018     06/04/2018    K 4.4 06/04/2018     06/04/2018    CREATININE 1.3 06/04/2018    BUN 17 06/04/2018    CO2 26 06/04/2018    TSH 1.682 06/04/2018    HGBA1C 6.0 (H) 06/04/2018       Assessment:     1. Chronic pain of right knee    2. Hypertension, unspecified type    3. Cervicalgia    4. Colon cancer screening         Plan:     Chronic pain of right knee  -     Cancel: X-Ray Knee 1 or 2 View Right; Future; Expected date: 11/12/2018  -     Ambulatory Referral to Physical/Occupational Therapy    Hypertension, unspecified type    Cervicalgia  -     Ambulatory Referral to Physical/Occupational Therapy    Colon cancer screening  -     Case request GI: COLONOSCOPY    Other orders  -     meloxicam (MOBIC) 15 MG tablet; Take 1 tablet (15 mg total) by mouth daily as needed for Pain.  Dispense: 30 tablet; Refill: 3  -     olmesartan-hydrochlorothiazide (BENICAR HCT) 40-25 mg per tablet; Take 1 tablet by mouth once daily.  Dispense: 90 tablet; Refill: 3  -     cyanocobalamin injection 1,000 mcg    Xray of knee today, with some degen changes  Start mobic once daily  Start PT for neck/arm and knee  Start benicar/HCT   F/u 1 mos for BP check

## 2018-12-03 ENCOUNTER — LAB VISIT (OUTPATIENT)
Dept: LAB | Facility: HOSPITAL | Age: 53
End: 2018-12-03
Attending: FAMILY MEDICINE
Payer: COMMERCIAL

## 2018-12-03 DIAGNOSIS — R73.09 ABNORMAL GLUCOSE: ICD-10-CM

## 2018-12-03 DIAGNOSIS — I10 HYPERTENSION, UNSPECIFIED TYPE: ICD-10-CM

## 2018-12-03 LAB
ALBUMIN SERPL BCP-MCNC: 3.5 G/DL
ALP SERPL-CCNC: 117 U/L
ALT SERPL W/O P-5'-P-CCNC: 29 U/L
ANION GAP SERPL CALC-SCNC: 5 MMOL/L
AST SERPL-CCNC: 27 U/L
BILIRUB SERPL-MCNC: 0.3 MG/DL
BUN SERPL-MCNC: 16 MG/DL
CALCIUM SERPL-MCNC: 9.7 MG/DL
CHLORIDE SERPL-SCNC: 106 MMOL/L
CO2 SERPL-SCNC: 30 MMOL/L
CREAT SERPL-MCNC: 1.1 MG/DL
EST. GFR  (AFRICAN AMERICAN): >60 ML/MIN/1.73 M^2
EST. GFR  (NON AFRICAN AMERICAN): 57.1 ML/MIN/1.73 M^2
ESTIMATED AVG GLUCOSE: 123 MG/DL
GLUCOSE SERPL-MCNC: 89 MG/DL
HBA1C MFR BLD HPLC: 5.9 %
POTASSIUM SERPL-SCNC: 4.6 MMOL/L
PROT SERPL-MCNC: 7.7 G/DL
SODIUM SERPL-SCNC: 141 MMOL/L

## 2018-12-03 PROCEDURE — 36415 COLL VENOUS BLD VENIPUNCTURE: CPT | Mod: PO

## 2018-12-03 PROCEDURE — 83036 HEMOGLOBIN GLYCOSYLATED A1C: CPT

## 2018-12-03 PROCEDURE — 80053 COMPREHEN METABOLIC PANEL: CPT

## 2018-12-31 ENCOUNTER — OFFICE VISIT (OUTPATIENT)
Dept: INTERNAL MEDICINE | Facility: CLINIC | Age: 53
End: 2018-12-31
Payer: COMMERCIAL

## 2018-12-31 VITALS
DIASTOLIC BLOOD PRESSURE: 82 MMHG | TEMPERATURE: 97 F | HEART RATE: 66 BPM | BODY MASS INDEX: 37.31 KG/M2 | WEIGHT: 210.56 LBS | HEIGHT: 63 IN | SYSTOLIC BLOOD PRESSURE: 120 MMHG

## 2018-12-31 DIAGNOSIS — E53.8 B12 DEFICIENCY: ICD-10-CM

## 2018-12-31 DIAGNOSIS — E66.01 MORBID OBESITY: ICD-10-CM

## 2018-12-31 DIAGNOSIS — Z29.9 PREVENTIVE MEASURE: ICD-10-CM

## 2018-12-31 DIAGNOSIS — I10 HYPERTENSION, UNSPECIFIED TYPE: Primary | ICD-10-CM

## 2018-12-31 DIAGNOSIS — Z12.11 COLON CANCER SCREENING: ICD-10-CM

## 2018-12-31 DIAGNOSIS — R73.09 ABNORMAL GLUCOSE: ICD-10-CM

## 2018-12-31 PROCEDURE — 3079F DIAST BP 80-89 MM HG: CPT | Mod: CPTII,S$GLB,, | Performed by: FAMILY MEDICINE

## 2018-12-31 PROCEDURE — 99214 OFFICE O/P EST MOD 30 MIN: CPT | Mod: 25,S$GLB,, | Performed by: FAMILY MEDICINE

## 2018-12-31 PROCEDURE — 3074F SYST BP LT 130 MM HG: CPT | Mod: CPTII,S$GLB,, | Performed by: FAMILY MEDICINE

## 2018-12-31 PROCEDURE — 3008F BODY MASS INDEX DOCD: CPT | Mod: CPTII,S$GLB,, | Performed by: FAMILY MEDICINE

## 2018-12-31 PROCEDURE — 96372 THER/PROPH/DIAG INJ SC/IM: CPT | Mod: S$GLB,,, | Performed by: FAMILY MEDICINE

## 2018-12-31 PROCEDURE — 99999 PR PBB SHADOW E&M-EST. PATIENT-LVL III: CPT | Mod: PBBFAC,,, | Performed by: FAMILY MEDICINE

## 2018-12-31 RX ORDER — CYANOCOBALAMIN 1000 UG/ML
1000 INJECTION, SOLUTION INTRAMUSCULAR; SUBCUTANEOUS
Status: COMPLETED | OUTPATIENT
Start: 2018-12-31 | End: 2018-12-31

## 2018-12-31 RX ADMIN — CYANOCOBALAMIN 1000 MCG: 1000 INJECTION, SOLUTION INTRAMUSCULAR; SUBCUTANEOUS at 10:12

## 2018-12-31 NOTE — PROGRESS NOTES
"Subjective:      Patient ID: Kassy Benjamin is a 53 y.o. female.    Chief Complaint: Follow-up (6 mo / labs )    HPI  52 yo female here for f/u on BP, B12, sugar.  She is taking benicar and doing well with it.  Has done some B12 shots past few mos.  Stable.  Not doing PT yet for neck/joints.  Plans on maybe the new year.  Trying to stay active and focus on weight loss.    Past Medical History:   Diagnosis Date    B12 deficiency anemia     Cervical radiculopathy     Goiter     Hyperlipidemia     Hypertension      Family History   Problem Relation Age of Onset    Hypertension Son     Colon cancer Mother 65    Diabetes Father     Breast cancer Neg Hx      Past Surgical History:   Procedure Laterality Date    BREAST BIOPSY Right     3/2014 at [facility]//neg     Social History     Tobacco Use    Smoking status: Never Smoker    Smokeless tobacco: Never Used   Substance Use Topics    Alcohol use: No    Drug use: No       /82 (BP Location: Left arm, Patient Position: Sitting, BP Method: X-Large (Manual))   Pulse 66   Temp 97 °F (36.1 °C) (Tympanic)   Ht 5' 3" (1.6 m)   Wt 95.5 kg (210 lb 8.6 oz)   LMP 07/25/2013   BMI 37.30 kg/m²     Review of Systems   Constitutional: Negative for activity change, appetite change, chills, diaphoresis, fatigue, fever and unexpected weight change.   HENT: Negative for ear pain, hearing loss, postnasal drip, rhinorrhea and tinnitus.    Eyes: Negative for visual disturbance.   Respiratory: Negative for cough, shortness of breath and wheezing.    Cardiovascular: Negative for chest pain, palpitations and leg swelling.   Gastrointestinal: Negative for abdominal distention.   Genitourinary: Negative for dysuria, frequency, hematuria and urgency.   Musculoskeletal: Positive for arthralgias. Negative for back pain and joint swelling.   Neurological: Negative for weakness and headaches.   Hematological: Negative for adenopathy.   Psychiatric/Behavioral: Negative for " confusion and decreased concentration.       Objective:     Physical Exam   Constitutional: She is oriented to person, place, and time. She appears well-developed and well-nourished. No distress.   HENT:   Nose: Nose normal.   Mouth/Throat: Oropharynx is clear and moist.   Eyes: Conjunctivae are normal. Pupils are equal, round, and reactive to light.   Neck: Normal range of motion. Neck supple. Carotid bruit is not present.   Cardiovascular: Normal rate, regular rhythm and normal heart sounds.   Pulmonary/Chest: Effort normal and breath sounds normal. No respiratory distress. She has no wheezes. She has no rales.   Abdominal: Soft. Bowel sounds are normal. She exhibits no distension. There is no tenderness. There is no guarding.   Musculoskeletal: She exhibits no edema.   Neurological: She is alert and oriented to person, place, and time. No cranial nerve deficit.   Skin: Skin is warm and dry. No rash noted.   Psychiatric: She has a normal mood and affect. Her behavior is normal. Judgment and thought content normal.   Nursing note and vitals reviewed.      Lab Results   Component Value Date    WBC 8.53 09/10/2018    HGB 12.4 09/10/2018    HCT 40.9 09/10/2018     09/10/2018    CHOL 156 06/04/2018    TRIG 78 06/04/2018    HDL 37 (L) 06/04/2018    ALT 29 12/03/2018    AST 27 12/03/2018     12/03/2018    K 4.6 12/03/2018     12/03/2018    CREATININE 1.1 12/03/2018    BUN 16 12/03/2018    CO2 30 (H) 12/03/2018    TSH 1.682 06/04/2018    HGBA1C 5.9 (H) 12/03/2018       Assessment:     1. Hypertension, unspecified type    2. B12 deficiency    3. Abnormal glucose    4. Morbid obesity    5. Colon cancer screening    6. Preventive measure         Plan:     Hypertension, unspecified type    B12 deficiency    Abnormal glucose    Morbid obesity    Colon cancer screening  -     Fecal Immunochemical Test (iFOBT); Future; Expected date: 12/31/2018    Preventive measure  -     CBC auto differential; Future;  Expected date: 06/30/2019  -     Comprehensive metabolic panel; Future; Expected date: 06/30/2019  -     Hemoglobin A1c; Future; Expected date: 06/30/2019  -     Lipid panel; Future; Expected date: 06/30/2019  -     TSH; Future; Expected date: 06/30/2019  -     Vitamin D; Future; Expected date: 06/30/2019  -     Vitamin B12; Future; Expected date: 06/30/2019    Other orders  -     cyanocobalamin injection 1,000 mcg    BP controlled, cont current regimen  Glucose stable, A1C impaired.  Focus on lifestyle changes and weight loss.  Cont to monitor  B12, shot today.  Start oral meds and recheck at 6 mos  Pt has not done colonoscopy yet, will plan for future.  Do fit kit now  Declines flu shot  F/u 6 mos with labs

## 2019-01-15 ENCOUNTER — PATIENT OUTREACH (OUTPATIENT)
Dept: ADMINISTRATIVE | Facility: HOSPITAL | Age: 54
End: 2019-01-15

## 2019-02-13 ENCOUNTER — TELEPHONE (OUTPATIENT)
Dept: INTERNAL MEDICINE | Facility: CLINIC | Age: 54
End: 2019-02-13

## 2019-02-13 NOTE — TELEPHONE ENCOUNTER
Called and left message that Dr. Asher had ordered a fecal / stool test at the last appointment with her in Dec.  We have not received it back yet and need for you to collect this and drop off at nearest [facility] location.  Please call if any questions.

## 2019-04-02 RX ORDER — MELOXICAM 15 MG/1
TABLET ORAL
Qty: 30 TABLET | Refills: 3 | Status: SHIPPED | OUTPATIENT
Start: 2019-04-02 | End: 2021-01-25 | Stop reason: SDUPTHER

## 2019-07-03 ENCOUNTER — TELEPHONE (OUTPATIENT)
Dept: INTERNAL MEDICINE | Facility: CLINIC | Age: 54
End: 2019-07-03

## 2019-07-03 NOTE — TELEPHONE ENCOUNTER
Tried calling pt to reschedule her appointment 7-1-19 and labs.  Voicemail full, can not leave message.

## 2019-08-06 ENCOUNTER — TELEPHONE (OUTPATIENT)
Dept: INTERNAL MEDICINE | Facility: CLINIC | Age: 54
End: 2019-08-06

## 2019-08-30 ENCOUNTER — TELEPHONE (OUTPATIENT)
Dept: INTERNAL MEDICINE | Facility: CLINIC | Age: 54
End: 2019-08-30

## 2019-08-30 NOTE — TELEPHONE ENCOUNTER
Called and left message for pt to call back to reschedule her 6 month f/u and labs that we had to cancel as Dr. Asher was out of clinic.

## 2019-10-03 ENCOUNTER — PATIENT OUTREACH (OUTPATIENT)
Dept: ADMINISTRATIVE | Facility: HOSPITAL | Age: 54
End: 2019-10-03

## 2019-10-03 NOTE — PROGRESS NOTES
Contacted patient to follow up on overdue Colonoscopy. Patient states she hasn't had a colonoscopy, but needs to schedule one. Informed the patient that her annual will be scheduled so she can discuss everything with Dr Asher

## 2019-12-18 ENCOUNTER — PATIENT OUTREACH (OUTPATIENT)
Dept: ADMINISTRATIVE | Facility: HOSPITAL | Age: 54
End: 2019-12-18

## 2019-12-18 NOTE — PROGRESS NOTES
HM reviewed.   Immunizations abstracted.  Care Everywhere abstracted. No new results found.  Health Maintenance Due   Topic    Colonoscopy     Mammogram    Attempted to call pt to schedule overdue HM items. Left VM.  Previsit chart audit completed.  *KDL*

## 2020-02-11 RX ORDER — OLMESARTAN MEDOXOMIL AND HYDROCHLOROTHIAZIDE 40/25 40; 25 MG/1; MG/1
1 TABLET ORAL DAILY
Qty: 30 TABLET | Refills: 0 | Status: SHIPPED | OUTPATIENT
Start: 2020-02-11 | End: 2020-03-16 | Stop reason: SDUPTHER

## 2020-02-11 NOTE — TELEPHONE ENCOUNTER
Called and left message that 30 day supply of medication sent in, will need an appt with Dr. Asher for further refills as she has not been seen in a year or longer.

## 2020-03-16 ENCOUNTER — OFFICE VISIT (OUTPATIENT)
Dept: INTERNAL MEDICINE | Facility: CLINIC | Age: 55
End: 2020-03-16
Payer: COMMERCIAL

## 2020-03-16 ENCOUNTER — TELEPHONE (OUTPATIENT)
Dept: INTERNAL MEDICINE | Facility: CLINIC | Age: 55
End: 2020-03-16

## 2020-03-16 VITALS
HEART RATE: 84 BPM | RESPIRATION RATE: 16 BRPM | DIASTOLIC BLOOD PRESSURE: 74 MMHG | BODY MASS INDEX: 38.31 KG/M2 | SYSTOLIC BLOOD PRESSURE: 110 MMHG | WEIGHT: 216.25 LBS | TEMPERATURE: 98 F

## 2020-03-16 DIAGNOSIS — D50.9 IRON DEFICIENCY ANEMIA, UNSPECIFIED IRON DEFICIENCY ANEMIA TYPE: ICD-10-CM

## 2020-03-16 DIAGNOSIS — Z12.11 SCREEN FOR COLON CANCER: ICD-10-CM

## 2020-03-16 DIAGNOSIS — Z12.39 SCREENING FOR BREAST CANCER: ICD-10-CM

## 2020-03-16 DIAGNOSIS — I10 ESSENTIAL HYPERTENSION: Primary | ICD-10-CM

## 2020-03-16 PROCEDURE — 99213 PR OFFICE/OUTPT VISIT, EST, LEVL III, 20-29 MIN: ICD-10-PCS | Mod: S$GLB,,, | Performed by: NURSE PRACTITIONER

## 2020-03-16 PROCEDURE — 99213 OFFICE O/P EST LOW 20 MIN: CPT | Mod: S$GLB,,, | Performed by: NURSE PRACTITIONER

## 2020-03-16 PROCEDURE — 99999 PR PBB SHADOW E&M-EST. PATIENT-LVL III: ICD-10-PCS | Mod: PBBFAC,,, | Performed by: NURSE PRACTITIONER

## 2020-03-16 PROCEDURE — 99999 PR PBB SHADOW E&M-EST. PATIENT-LVL III: CPT | Mod: PBBFAC,,, | Performed by: NURSE PRACTITIONER

## 2020-03-16 RX ORDER — OLMESARTAN MEDOXOMIL AND HYDROCHLOROTHIAZIDE 40/25 40; 25 MG/1; MG/1
1 TABLET ORAL DAILY
Qty: 90 TABLET | Refills: 0 | Status: SHIPPED | OUTPATIENT
Start: 2020-03-16 | End: 2020-05-29

## 2020-03-16 NOTE — TELEPHONE ENCOUNTER
----- Message from Gurpreet Myers sent at 3/16/2020  8:34 AM CDT -----  Contact: self [phone]  .Type:  Sooner Apoointment Request    Caller is requesting a sooner appointment.  Caller declined first available appointment listed below.  Caller will not accept being placed on the waitlist and is requesting a message be sent to doctor.  Name of Caller:Kassy Benjamin  When is the first available appointment?04/20/20  Symptoms:check-up  Would the patient rather a call back or a response via [facility]? Call back  Best Call Back Number:[phone]  Additional Information: Pt would also like mother to worked in for same date and time

## 2020-03-16 NOTE — PROGRESS NOTES
Subjective:       Patient ID: Kassy Benjamin is a 54 y.o. female.    Chief Complaint: Medication Refill    54 year old female here requesting refill of benicar hct. Works at [employer]. Doing well. Has visit with Dr. BAER April 20. Wants med refilled until she can see Dr. BAER. I offered to do annual today, she wants to wait and see Dr. BAER next month for wellness. bp controlled. Doing well. No headache, cp, shortness of breath. No leg swelling.    Medication Refill   Pertinent negatives include no abdominal pain, chest pain, chills, coughing, diaphoresis, fatigue, fever, headaches, nausea or vomiting.       /74 (BP Location: Left arm, Patient Position: Sitting)   Pulse 84   Temp 97.5 °F (36.4 °C) (Oral)   Resp 16   Wt 98.1 kg (216 lb 4.3 oz)   LMP 07/25/2013   BMI 38.31 kg/m²     Review of Systems   Constitutional: Negative for activity change, appetite change, chills, diaphoresis, fatigue, fever and unexpected weight change.   HENT: Negative.    Eyes: Negative for visual disturbance.   Respiratory: Negative for cough, shortness of breath and wheezing.    Cardiovascular: Negative for chest pain, palpitations and leg swelling.   Gastrointestinal: Negative for abdominal distention, abdominal pain, blood in stool, constipation, diarrhea, nausea and vomiting.   Genitourinary: Negative for decreased urine volume, difficulty urinating, dysuria, frequency, hematuria and urgency.   Neurological: Negative.  Negative for dizziness, syncope, speech difficulty, light-headedness and headaches.   Psychiatric/Behavioral: Negative for agitation, confusion and hallucinations. The patient is not nervous/anxious.        Objective:      Physical Exam   Constitutional: She is oriented to person, place, and time. She appears well-developed and well-nourished. She is cooperative. No distress.   HENT:   Head: Normocephalic and atraumatic.   Eyes: Conjunctivae are normal. Right eye exhibits no discharge. Left eye exhibits  no discharge.   Cardiovascular: Normal rate, regular rhythm and normal heart sounds.   No murmur heard.  Pulmonary/Chest: Effort normal and breath sounds normal. No respiratory distress. She has no wheezes. She has no rales. She exhibits no tenderness.   Abdominal: Soft. She exhibits no distension.   Musculoskeletal: Normal range of motion.   Neurological: She is alert and oriented to person, place, and time.   Skin: Skin is warm and dry. No rash noted. She is not diaphoretic.   Psychiatric: She has a normal mood and affect. Her behavior is normal. Judgment and thought content normal.   Nursing note and vitals reviewed.      Assessment:       1. Essential hypertension    2. Screening for breast cancer    3. Screen for colon cancer    4. Iron deficiency anemia, unspecified iron deficiency anemia type        Plan:       Kassy was seen today for medication refill.    Diagnoses and all orders for this visit:    Essential hypertension  -     olmesartan-hydrochlorothiazide (BENICAR HCT) 40-25 mg per tablet; Take 1 tablet by mouth once daily.  -     CBC auto differential; Future  -     Comprehensive metabolic panel; Future  -     Lipid panel; Future  -     TSH; Future  -     Hemoglobin A1c; Future    Screening for breast cancer  -     Mammo Digital Screening Bilat; Future    Screen for colon cancer  -     Case request GI: COLONOSCOPY    Iron deficiency anemia, unspecified iron deficiency anemia type  -     Ferritin; Future  -     Iron and TIBC; Future  -     Vitamin B12; Future    med refilled per request  HM updated  Will do labs prior to 4/20 visit with Dr. BAER

## 2020-04-13 ENCOUNTER — LAB VISIT (OUTPATIENT)
Dept: LAB | Facility: HOSPITAL | Age: 55
End: 2020-04-13
Attending: NURSE PRACTITIONER
Payer: COMMERCIAL

## 2020-04-13 DIAGNOSIS — I10 ESSENTIAL HYPERTENSION: ICD-10-CM

## 2020-04-13 DIAGNOSIS — D50.9 IRON DEFICIENCY ANEMIA, UNSPECIFIED IRON DEFICIENCY ANEMIA TYPE: ICD-10-CM

## 2020-04-13 LAB
ALBUMIN SERPL BCP-MCNC: 3.7 G/DL (ref 3.5–5.2)
ALP SERPL-CCNC: 125 U/L (ref 55–135)
ALT SERPL W/O P-5'-P-CCNC: 32 U/L (ref 10–44)
ANION GAP SERPL CALC-SCNC: 8 MMOL/L (ref 8–16)
AST SERPL-CCNC: 28 U/L (ref 10–40)
BASOPHILS # BLD AUTO: 0.06 K/UL (ref 0–0.2)
BASOPHILS NFR BLD: 0.7 % (ref 0–1.9)
BILIRUB SERPL-MCNC: 0.3 MG/DL (ref 0.1–1)
BUN SERPL-MCNC: 26 MG/DL (ref 6–20)
CALCIUM SERPL-MCNC: 9.4 MG/DL (ref 8.7–10.5)
CHLORIDE SERPL-SCNC: 106 MMOL/L (ref 95–110)
CHOLEST SERPL-MCNC: 166 MG/DL (ref 120–199)
CHOLEST/HDLC SERPL: 4.2 {RATIO} (ref 2–5)
CO2 SERPL-SCNC: 26 MMOL/L (ref 23–29)
CREAT SERPL-MCNC: 1.4 MG/DL (ref 0.5–1.4)
DIFFERENTIAL METHOD: ABNORMAL
EOSINOPHIL # BLD AUTO: 0.2 K/UL (ref 0–0.5)
EOSINOPHIL NFR BLD: 2.2 % (ref 0–8)
ERYTHROCYTE [DISTWIDTH] IN BLOOD BY AUTOMATED COUNT: 14.1 % (ref 11.5–14.5)
EST. GFR  (AFRICAN AMERICAN): 48.8 ML/MIN/1.73 M^2
EST. GFR  (NON AFRICAN AMERICAN): 42.3 ML/MIN/1.73 M^2
ESTIMATED AVG GLUCOSE: 131 MG/DL (ref 68–131)
FERRITIN SERPL-MCNC: 204 NG/ML (ref 20–300)
GLUCOSE SERPL-MCNC: 81 MG/DL (ref 70–110)
HBA1C MFR BLD HPLC: 6.2 % (ref 4–5.6)
HCT VFR BLD AUTO: 36.3 % (ref 37–48.5)
HDLC SERPL-MCNC: 40 MG/DL (ref 40–75)
HDLC SERPL: 24.1 % (ref 20–50)
HGB BLD-MCNC: 11 G/DL (ref 12–16)
IMM GRANULOCYTES # BLD AUTO: 0.02 K/UL (ref 0–0.04)
IMM GRANULOCYTES NFR BLD AUTO: 0.2 % (ref 0–0.5)
IRON SERPL-MCNC: 62 UG/DL (ref 30–160)
LDLC SERPL CALC-MCNC: 108.6 MG/DL (ref 63–159)
LYMPHOCYTES # BLD AUTO: 3.6 K/UL (ref 1–4.8)
LYMPHOCYTES NFR BLD: 41.7 % (ref 18–48)
MCH RBC QN AUTO: 30.1 PG (ref 27–31)
MCHC RBC AUTO-ENTMCNC: 30.3 G/DL (ref 32–36)
MCV RBC AUTO: 100 FL (ref 82–98)
MONOCYTES # BLD AUTO: 0.5 K/UL (ref 0.3–1)
MONOCYTES NFR BLD: 5.6 % (ref 4–15)
NEUTROPHILS # BLD AUTO: 4.3 K/UL (ref 1.8–7.7)
NEUTROPHILS NFR BLD: 49.6 % (ref 38–73)
NONHDLC SERPL-MCNC: 126 MG/DL
NRBC BLD-RTO: 0 /100 WBC
PLATELET # BLD AUTO: 264 K/UL (ref 150–350)
PMV BLD AUTO: 10.1 FL (ref 9.2–12.9)
POTASSIUM SERPL-SCNC: 4.5 MMOL/L (ref 3.5–5.1)
PROT SERPL-MCNC: 8.3 G/DL (ref 6–8.4)
RBC # BLD AUTO: 3.65 M/UL (ref 4–5.4)
SATURATED IRON: 18 % (ref 20–50)
SODIUM SERPL-SCNC: 140 MMOL/L (ref 136–145)
TOTAL IRON BINDING CAPACITY: 354 UG/DL (ref 250–450)
TRANSFERRIN SERPL-MCNC: 239 MG/DL (ref 200–375)
TRIGL SERPL-MCNC: 87 MG/DL (ref 30–150)
TSH SERPL DL<=0.005 MIU/L-ACNC: 3.46 UIU/ML (ref 0.4–4)
VIT B12 SERPL-MCNC: 420 PG/ML (ref 210–950)
WBC # BLD AUTO: 8.68 K/UL (ref 3.9–12.7)

## 2020-04-13 PROCEDURE — 83036 HEMOGLOBIN GLYCOSYLATED A1C: CPT

## 2020-04-13 PROCEDURE — 80053 COMPREHEN METABOLIC PANEL: CPT

## 2020-04-13 PROCEDURE — 83540 ASSAY OF IRON: CPT

## 2020-04-13 PROCEDURE — 82607 VITAMIN B-12: CPT

## 2020-04-13 PROCEDURE — 82728 ASSAY OF FERRITIN: CPT

## 2020-04-13 PROCEDURE — 84443 ASSAY THYROID STIM HORMONE: CPT

## 2020-04-13 PROCEDURE — 85025 COMPLETE CBC W/AUTO DIFF WBC: CPT

## 2020-04-13 PROCEDURE — 36415 COLL VENOUS BLD VENIPUNCTURE: CPT | Mod: PO

## 2020-04-13 PROCEDURE — 80061 LIPID PANEL: CPT

## 2020-04-15 ENCOUNTER — TELEPHONE (OUTPATIENT)
Dept: INTERNAL MEDICINE | Facility: CLINIC | Age: 55
End: 2020-04-15

## 2020-04-15 NOTE — TELEPHONE ENCOUNTER
Tried calling pt to reschedule annual exam out 2 months that she has booked on 4- with Dr. Asher and voicemail is full.  Sent portal message.

## 2020-05-11 ENCOUNTER — PATIENT OUTREACH (OUTPATIENT)
Dept: ADMINISTRATIVE | Facility: HOSPITAL | Age: 55
End: 2020-05-11

## 2020-05-11 NOTE — PROGRESS NOTES
Patient overdue for mammogram. Called to schedule, pt agreed to 7/27/2020 same day as upcoming pcp appt. Pt also stated she would do pap smear at annual visit same day with dr amador. Noted pap in appt notes.   *KDL*

## 2020-05-21 ENCOUNTER — TELEPHONE (OUTPATIENT)
Dept: ENDOSCOPY | Facility: HOSPITAL | Age: 55
End: 2020-05-21

## 2020-05-28 DIAGNOSIS — I10 ESSENTIAL HYPERTENSION: ICD-10-CM

## 2020-05-29 RX ORDER — OLMESARTAN MEDOXOMIL AND HYDROCHLOROTHIAZIDE 40/25 40; 25 MG/1; MG/1
1 TABLET ORAL DAILY
Qty: 90 TABLET | Refills: 0 | Status: SHIPPED | OUTPATIENT
Start: 2020-05-29 | End: 2020-08-31

## 2020-11-09 ENCOUNTER — OFFICE VISIT (OUTPATIENT)
Dept: INTERNAL MEDICINE | Facility: CLINIC | Age: 55
End: 2020-11-09
Payer: COMMERCIAL

## 2020-11-09 ENCOUNTER — LAB VISIT (OUTPATIENT)
Dept: LAB | Facility: HOSPITAL | Age: 55
End: 2020-11-09
Attending: FAMILY MEDICINE
Payer: COMMERCIAL

## 2020-11-09 VITALS
WEIGHT: 215.19 LBS | BODY MASS INDEX: 38.13 KG/M2 | HEART RATE: 66 BPM | HEIGHT: 63 IN | TEMPERATURE: 99 F | SYSTOLIC BLOOD PRESSURE: 110 MMHG | DIASTOLIC BLOOD PRESSURE: 72 MMHG

## 2020-11-09 DIAGNOSIS — N28.9 ABNORMAL KIDNEY FUNCTION: ICD-10-CM

## 2020-11-09 DIAGNOSIS — Z12.4 CERVICAL CANCER SCREENING: ICD-10-CM

## 2020-11-09 DIAGNOSIS — Z12.31 ENCOUNTER FOR SCREENING MAMMOGRAM FOR MALIGNANT NEOPLASM OF BREAST: ICD-10-CM

## 2020-11-09 DIAGNOSIS — R73.09 ABNORMAL GLUCOSE: ICD-10-CM

## 2020-11-09 DIAGNOSIS — Z12.11 COLON CANCER SCREENING: ICD-10-CM

## 2020-11-09 DIAGNOSIS — Z00.00 ANNUAL PHYSICAL EXAM: Primary | ICD-10-CM

## 2020-11-09 DIAGNOSIS — I10 ESSENTIAL HYPERTENSION: ICD-10-CM

## 2020-11-09 PROCEDURE — 83036 HEMOGLOBIN GLYCOSYLATED A1C: CPT

## 2020-11-09 PROCEDURE — 36415 COLL VENOUS BLD VENIPUNCTURE: CPT | Mod: PO

## 2020-11-09 PROCEDURE — 99396 PREV VISIT EST AGE 40-64: CPT | Mod: S$GLB,,, | Performed by: FAMILY MEDICINE

## 2020-11-09 PROCEDURE — 99999 PR PBB SHADOW E&M-EST. PATIENT-LVL III: CPT | Mod: PBBFAC,,, | Performed by: FAMILY MEDICINE

## 2020-11-09 PROCEDURE — 88175 CYTOPATH C/V AUTO FLUID REDO: CPT

## 2020-11-09 PROCEDURE — 99396 PR PREVENTIVE VISIT,EST,40-64: ICD-10-PCS | Mod: S$GLB,,, | Performed by: FAMILY MEDICINE

## 2020-11-09 PROCEDURE — 80048 BASIC METABOLIC PNL TOTAL CA: CPT

## 2020-11-09 PROCEDURE — 87624 HPV HI-RISK TYP POOLED RSLT: CPT

## 2020-11-09 PROCEDURE — 99999 PR PBB SHADOW E&M-EST. PATIENT-LVL III: ICD-10-PCS | Mod: PBBFAC,,, | Performed by: FAMILY MEDICINE

## 2020-11-09 RX ORDER — OLMESARTAN MEDOXOMIL AND HYDROCHLOROTHIAZIDE 40/25 40; 25 MG/1; MG/1
1 TABLET ORAL DAILY
Qty: 90 TABLET | Refills: 3 | Status: SHIPPED | OUTPATIENT
Start: 2020-11-09 | End: 2022-02-09

## 2020-11-09 NOTE — PROGRESS NOTES
"Subjective:      Patient ID: Kassy Benjamin is a 55 y.o. female.    Chief Complaint: Annual Exam    HPI  54 yo with HTN, hyperlipidemia, abnormal glucose, obesity here for annual.  Haven't seen her in awhile.  Taking BP med  Drinking a lot of soda, trying to cut back.  Due for female exam    Past Medical History:   Diagnosis Date    B12 deficiency anemia     Cervical radiculopathy     Goiter     Hyperlipidemia     Hypertension      Family History   Problem Relation Age of Onset    Hypertension Son     Colon cancer Mother 65    Diabetes Father     Breast cancer Neg Hx      Past Surgical History:   Procedure Laterality Date    BREAST BIOPSY Right     3/2014 at [facility]//neg     Social History     Tobacco Use    Smoking status: Never Smoker    Smokeless tobacco: Never Used   Substance Use Topics    Alcohol use: No    Drug use: No       /72   Pulse 66   Temp 98.5 °F (36.9 °C) (Temporal)   Ht 5' 3" (1.6 m)   Wt 97.6 kg (215 lb 2.7 oz)   LMP 07/25/2013   BMI 38.12 kg/m²     Review of Systems   Constitutional: Negative for activity change, appetite change, chills, diaphoresis, fatigue, fever and unexpected weight change.   HENT: Negative for ear pain, hearing loss, postnasal drip, rhinorrhea and tinnitus.    Eyes: Negative for visual disturbance.   Respiratory: Negative for cough, shortness of breath and wheezing.    Cardiovascular: Negative for chest pain, palpitations and leg swelling.   Gastrointestinal: Negative for abdominal distention.   Genitourinary: Negative for dysuria, frequency, hematuria and urgency.   Musculoskeletal: Negative for back pain and joint swelling.   Neurological: Negative for weakness and headaches.   Hematological: Negative for adenopathy.   Psychiatric/Behavioral: Negative for confusion and decreased concentration.       Objective:     Physical Exam  Vitals signs and nursing note reviewed.   Constitutional:       General: She is not in acute distress.     " Appearance: She is well-developed.   HENT:      Right Ear: External ear normal.      Left Ear: External ear normal.      Nose: Nose normal.   Eyes:      Conjunctiva/sclera: Conjunctivae normal.      Pupils: Pupils are equal, round, and reactive to light.   Neck:      Musculoskeletal: Normal range of motion and neck supple.      Thyroid: No thyromegaly.      Vascular: No carotid bruit.   Cardiovascular:      Rate and Rhythm: Normal rate and regular rhythm.      Heart sounds: Normal heart sounds. No murmur. No gallop.    Pulmonary:      Effort: Pulmonary effort is normal. No respiratory distress.      Breath sounds: Normal breath sounds. No wheezing or rales.   Chest:      Breasts:         Right: Normal. No swelling, bleeding, inverted nipple, mass, nipple discharge, skin change or tenderness.         Left: Normal. No bleeding, inverted nipple, mass, nipple discharge, skin change or tenderness.   Abdominal:      General: Bowel sounds are normal. There is no distension.      Palpations: Abdomen is soft.      Tenderness: There is no abdominal tenderness. There is no guarding.   Musculoskeletal: Normal range of motion.   Lymphadenopathy:      Cervical: No cervical adenopathy.   Skin:     General: Skin is warm and dry.      Findings: No rash.   Neurological:      Mental Status: She is alert and oriented to person, place, and time.      Cranial Nerves: No cranial nerve deficit.   Psychiatric:         Behavior: Behavior normal.         Thought Content: Thought content normal.         Judgment: Judgment normal.         Lab Results   Component Value Date    WBC 8.68 04/13/2020    HGB 11.0 (L) 04/13/2020    HCT 36.3 (L) 04/13/2020     04/13/2020    CHOL 166 04/13/2020    TRIG 87 04/13/2020    HDL 40 04/13/2020    ALT 32 04/13/2020    AST 28 04/13/2020     04/13/2020    K 4.5 04/13/2020     04/13/2020    CREATININE 1.4 04/13/2020    BUN 26 (H) 04/13/2020    CO2 26 04/13/2020    TSH 3.460 04/13/2020    HGBA1C  6.2 (H) 04/13/2020       Assessment:     1. Annual physical exam    2. Abnormal glucose    3. Colon cancer screening    4. Encounter for screening mammogram for malignant neoplasm of breast    5. Abnormal kidney function    6. Cervical cancer screening    7. Essential hypertension         Plan:     Annual physical exam    Abnormal glucose  -     Hemoglobin A1C; Future; Expected date: 11/09/2020    Colon cancer screening  -     Case request GI: COLONOSCOPY    Encounter for screening mammogram for malignant neoplasm of breast  -     Mammo Digital Screening Bilat w/ Nikolai; Future; Expected date: 11/09/2020    Abnormal kidney function  -     Basic Metabolic Panel; Future; Expected date: 11/09/2020    Cervical cancer screening  -     Liquid-Based Pap Smear, Screening  -     HPV High Risk Genotypes, PCR    Essential hypertension  -     olmesartan-hydrochlorothiazide (BENICAR HCT) 40-25 mg per tablet; Take 1 tablet by mouth once daily.  Dispense: 90 tablet; Refill: 3    Reviewed prior labs  Kidney function//off recheck.  Drink more water//get off all soda  A1C too high//consider Rybelsus or Ozempic if high  Work on diet/exercise/weight loss  Bp stable/cont med  Update pap today with HPV cotest  Update Mammo  Colonoscopy order in as well  F/u to be determined after lab review

## 2020-11-10 LAB
ANION GAP SERPL CALC-SCNC: 9 MMOL/L (ref 8–16)
BUN SERPL-MCNC: 24 MG/DL (ref 6–20)
CALCIUM SERPL-MCNC: 9.5 MG/DL (ref 8.7–10.5)
CHLORIDE SERPL-SCNC: 108 MMOL/L (ref 95–110)
CO2 SERPL-SCNC: 25 MMOL/L (ref 23–29)
CREAT SERPL-MCNC: 1.3 MG/DL (ref 0.5–1.4)
EST. GFR  (AFRICAN AMERICAN): 53 ML/MIN/1.73 M^2
EST. GFR  (NON AFRICAN AMERICAN): 46 ML/MIN/1.73 M^2
ESTIMATED AVG GLUCOSE: 131 MG/DL (ref 68–131)
GLUCOSE SERPL-MCNC: 68 MG/DL (ref 70–110)
HBA1C MFR BLD HPLC: 6.2 % (ref 4–5.6)
POTASSIUM SERPL-SCNC: 4.4 MMOL/L (ref 3.5–5.1)
SODIUM SERPL-SCNC: 142 MMOL/L (ref 136–145)

## 2020-11-16 ENCOUNTER — HOSPITAL ENCOUNTER (OUTPATIENT)
Dept: RADIOLOGY | Facility: HOSPITAL | Age: 55
Discharge: HOME | End: 2020-11-16
Attending: FAMILY MEDICINE
Payer: COMMERCIAL

## 2020-11-16 DIAGNOSIS — Z12.31 ENCOUNTER FOR SCREENING MAMMOGRAM FOR MALIGNANT NEOPLASM OF BREAST: ICD-10-CM

## 2020-11-16 LAB
HPV HR 12 DNA SPEC QL NAA+PROBE: NEGATIVE
HPV16 AG SPEC QL: NEGATIVE
HPV18 DNA SPEC QL NAA+PROBE: NEGATIVE

## 2020-11-16 PROCEDURE — 77063 BREAST TOMOSYNTHESIS BI: CPT | Mod: 26,,, | Performed by: RADIOLOGY

## 2020-11-16 PROCEDURE — 77063 MAMMO DIGITAL SCREENING BILAT WITH TOMO: ICD-10-PCS | Mod: 26,,, | Performed by: RADIOLOGY

## 2020-11-16 PROCEDURE — 77067 MAMMO DIGITAL SCREENING BILAT WITH TOMO: ICD-10-PCS | Mod: 26,,, | Performed by: RADIOLOGY

## 2020-11-16 PROCEDURE — 77067 SCR MAMMO BI INCL CAD: CPT | Mod: 26,,, | Performed by: RADIOLOGY

## 2020-11-16 PROCEDURE — 77067 SCR MAMMO BI INCL CAD: CPT | Mod: TC

## 2020-11-18 ENCOUNTER — TELEPHONE (OUTPATIENT)
Dept: ENDOSCOPY | Facility: HOSPITAL | Age: 55
End: 2020-11-18

## 2020-11-18 NOTE — TELEPHONE ENCOUNTER
Attempted to schedule procedure with patient, she declined d/t being at work.  Patient stated she would call office to schedule procedure when ready.

## 2020-12-16 LAB
FINAL PATHOLOGIC DIAGNOSIS: NORMAL
Lab: NORMAL

## 2021-01-19 ENCOUNTER — TELEPHONE (OUTPATIENT)
Dept: INTERNAL MEDICINE | Facility: CLINIC | Age: 56
End: 2021-01-19

## 2021-01-25 ENCOUNTER — OFFICE VISIT (OUTPATIENT)
Dept: INTERNAL MEDICINE | Facility: CLINIC | Age: 56
End: 2021-01-25
Payer: COMMERCIAL

## 2021-01-25 VITALS
SYSTOLIC BLOOD PRESSURE: 130 MMHG | HEART RATE: 72 BPM | DIASTOLIC BLOOD PRESSURE: 78 MMHG | BODY MASS INDEX: 40.46 KG/M2 | HEIGHT: 61 IN | RESPIRATION RATE: 18 BRPM | WEIGHT: 214.31 LBS | TEMPERATURE: 98 F

## 2021-01-25 DIAGNOSIS — M79.671 RIGHT FOOT PAIN: ICD-10-CM

## 2021-01-25 DIAGNOSIS — D64.9 ANEMIA, UNSPECIFIED TYPE: ICD-10-CM

## 2021-01-25 DIAGNOSIS — R73.09 ABNORMAL GLUCOSE: ICD-10-CM

## 2021-01-25 DIAGNOSIS — I10 ESSENTIAL HYPERTENSION: ICD-10-CM

## 2021-01-25 DIAGNOSIS — R73.03 PREDIABETES: Primary | ICD-10-CM

## 2021-01-25 PROCEDURE — 99214 OFFICE O/P EST MOD 30 MIN: CPT | Mod: S$GLB,,, | Performed by: NURSE PRACTITIONER

## 2021-01-25 PROCEDURE — 99214 PR OFFICE/OUTPT VISIT, EST, LEVL IV, 30-39 MIN: ICD-10-PCS | Mod: S$GLB,,, | Performed by: NURSE PRACTITIONER

## 2021-01-25 PROCEDURE — 99999 PR PBB SHADOW E&M-EST. PATIENT-LVL III: CPT | Mod: PBBFAC,,, | Performed by: NURSE PRACTITIONER

## 2021-01-25 PROCEDURE — 99999 PR PBB SHADOW E&M-EST. PATIENT-LVL III: ICD-10-PCS | Mod: PBBFAC,,, | Performed by: NURSE PRACTITIONER

## 2021-01-25 RX ORDER — MELOXICAM 15 MG/1
15 TABLET ORAL DAILY PRN
Qty: 30 TABLET | Refills: 3 | Status: SHIPPED | OUTPATIENT
Start: 2021-01-25 | End: 2021-04-26

## 2021-01-25 RX ORDER — METFORMIN HYDROCHLORIDE 500 MG/1
500 TABLET, EXTENDED RELEASE ORAL 2 TIMES DAILY WITH MEALS
Qty: 60 TABLET | Refills: 11 | Status: SHIPPED | OUTPATIENT
Start: 2021-01-25 | End: 2022-04-21 | Stop reason: SDUPTHER

## 2021-04-19 ENCOUNTER — LAB VISIT (OUTPATIENT)
Dept: LAB | Facility: HOSPITAL | Age: 56
End: 2021-04-19
Attending: NURSE PRACTITIONER
Payer: COMMERCIAL

## 2021-04-19 DIAGNOSIS — R73.03 PREDIABETES: ICD-10-CM

## 2021-04-19 DIAGNOSIS — D64.9 ANEMIA, UNSPECIFIED TYPE: ICD-10-CM

## 2021-04-19 LAB
ALBUMIN SERPL BCP-MCNC: 3.5 G/DL (ref 3.5–5.2)
ALP SERPL-CCNC: 114 U/L (ref 55–135)
ALT SERPL W/O P-5'-P-CCNC: 34 U/L (ref 10–44)
ANION GAP SERPL CALC-SCNC: 8 MMOL/L (ref 8–16)
AST SERPL-CCNC: 27 U/L (ref 10–40)
BASOPHILS # BLD AUTO: 0.03 K/UL (ref 0–0.2)
BASOPHILS NFR BLD: 0.5 % (ref 0–1.9)
BILIRUB SERPL-MCNC: 0.3 MG/DL (ref 0.1–1)
BUN SERPL-MCNC: 21 MG/DL (ref 6–20)
CALCIUM SERPL-MCNC: 9.2 MG/DL (ref 8.7–10.5)
CHLORIDE SERPL-SCNC: 108 MMOL/L (ref 95–110)
CHOLEST SERPL-MCNC: 155 MG/DL (ref 120–199)
CHOLEST/HDLC SERPL: 4.1 {RATIO} (ref 2–5)
CO2 SERPL-SCNC: 27 MMOL/L (ref 23–29)
CREAT SERPL-MCNC: 1.2 MG/DL (ref 0.5–1.4)
DIFFERENTIAL METHOD: ABNORMAL
EOSINOPHIL # BLD AUTO: 0.1 K/UL (ref 0–0.5)
EOSINOPHIL NFR BLD: 1.8 % (ref 0–8)
ERYTHROCYTE [DISTWIDTH] IN BLOOD BY AUTOMATED COUNT: 13.8 % (ref 11.5–14.5)
EST. GFR  (AFRICAN AMERICAN): 58.4 ML/MIN/1.73 M^2
EST. GFR  (NON AFRICAN AMERICAN): 50.6 ML/MIN/1.73 M^2
ESTIMATED AVG GLUCOSE: 128 MG/DL (ref 68–131)
FERRITIN SERPL-MCNC: 162 NG/ML (ref 20–300)
GLUCOSE SERPL-MCNC: 83 MG/DL (ref 70–110)
HBA1C MFR BLD: 6.1 % (ref 4–5.6)
HCT VFR BLD AUTO: 36.7 % (ref 37–48.5)
HDLC SERPL-MCNC: 38 MG/DL (ref 40–75)
HDLC SERPL: 24.5 % (ref 20–50)
HGB BLD-MCNC: 11.2 G/DL (ref 12–16)
IMM GRANULOCYTES # BLD AUTO: 0.02 K/UL (ref 0–0.04)
IMM GRANULOCYTES NFR BLD AUTO: 0.3 % (ref 0–0.5)
INSULIN COLLECTION INTERVAL: ABNORMAL
INSULIN SERPL-ACNC: 26.4 UU/ML
IRON SERPL-MCNC: 57 UG/DL (ref 30–160)
LDLC SERPL CALC-MCNC: 95.6 MG/DL (ref 63–159)
LYMPHOCYTES # BLD AUTO: 1.9 K/UL (ref 1–4.8)
LYMPHOCYTES NFR BLD: 29.3 % (ref 18–48)
MCH RBC QN AUTO: 29.9 PG (ref 27–31)
MCHC RBC AUTO-ENTMCNC: 30.5 G/DL (ref 32–36)
MCV RBC AUTO: 98 FL (ref 82–98)
MONOCYTES # BLD AUTO: 0.4 K/UL (ref 0.3–1)
MONOCYTES NFR BLD: 5.7 % (ref 4–15)
NEUTROPHILS # BLD AUTO: 4.1 K/UL (ref 1.8–7.7)
NEUTROPHILS NFR BLD: 62.4 % (ref 38–73)
NONHDLC SERPL-MCNC: 117 MG/DL
NRBC BLD-RTO: 0 /100 WBC
PLATELET # BLD AUTO: 265 K/UL (ref 150–450)
PMV BLD AUTO: 10.1 FL (ref 9.2–12.9)
POTASSIUM SERPL-SCNC: 4.4 MMOL/L (ref 3.5–5.1)
PROT SERPL-MCNC: 7.8 G/DL (ref 6–8.4)
RBC # BLD AUTO: 3.75 M/UL (ref 4–5.4)
SATURATED IRON: 17 % (ref 20–50)
SODIUM SERPL-SCNC: 143 MMOL/L (ref 136–145)
TOTAL IRON BINDING CAPACITY: 336 UG/DL (ref 250–450)
TRANSFERRIN SERPL-MCNC: 227 MG/DL (ref 200–375)
TRIGL SERPL-MCNC: 107 MG/DL (ref 30–150)
TSH SERPL DL<=0.005 MIU/L-ACNC: 1.9 UIU/ML (ref 0.4–4)
VIT B12 SERPL-MCNC: 1617 PG/ML (ref 210–950)
WBC # BLD AUTO: 6.62 K/UL (ref 3.9–12.7)

## 2021-04-19 PROCEDURE — 80061 LIPID PANEL: CPT | Performed by: NURSE PRACTITIONER

## 2021-04-19 PROCEDURE — 85025 COMPLETE CBC W/AUTO DIFF WBC: CPT | Performed by: NURSE PRACTITIONER

## 2021-04-19 PROCEDURE — 36415 COLL VENOUS BLD VENIPUNCTURE: CPT | Mod: PO | Performed by: NURSE PRACTITIONER

## 2021-04-19 PROCEDURE — 84443 ASSAY THYROID STIM HORMONE: CPT | Performed by: NURSE PRACTITIONER

## 2021-04-19 PROCEDURE — 83036 HEMOGLOBIN GLYCOSYLATED A1C: CPT | Performed by: NURSE PRACTITIONER

## 2021-04-19 PROCEDURE — 80053 COMPREHEN METABOLIC PANEL: CPT | Performed by: NURSE PRACTITIONER

## 2021-04-19 PROCEDURE — 82607 VITAMIN B-12: CPT | Performed by: NURSE PRACTITIONER

## 2021-04-19 PROCEDURE — 83540 ASSAY OF IRON: CPT | Performed by: NURSE PRACTITIONER

## 2021-04-19 PROCEDURE — 82728 ASSAY OF FERRITIN: CPT | Performed by: NURSE PRACTITIONER

## 2021-04-19 PROCEDURE — 83525 ASSAY OF INSULIN: CPT | Performed by: NURSE PRACTITIONER

## 2021-04-26 ENCOUNTER — OFFICE VISIT (OUTPATIENT)
Dept: INTERNAL MEDICINE | Facility: CLINIC | Age: 56
End: 2021-04-26
Payer: COMMERCIAL

## 2021-04-26 ENCOUNTER — TELEPHONE (OUTPATIENT)
Dept: ADMINISTRATIVE | Facility: HOSPITAL | Age: 56
End: 2021-04-26

## 2021-04-26 VITALS
SYSTOLIC BLOOD PRESSURE: 120 MMHG | BODY MASS INDEX: 40.16 KG/M2 | HEART RATE: 64 BPM | TEMPERATURE: 98 F | WEIGHT: 204.56 LBS | DIASTOLIC BLOOD PRESSURE: 76 MMHG | HEIGHT: 60 IN

## 2021-04-26 DIAGNOSIS — R73.03 PREDIABETES: Primary | ICD-10-CM

## 2021-04-26 DIAGNOSIS — D64.9 ANEMIA, UNSPECIFIED TYPE: ICD-10-CM

## 2021-04-26 DIAGNOSIS — I10 ESSENTIAL HYPERTENSION: ICD-10-CM

## 2021-04-26 DIAGNOSIS — N18.31 STAGE 3A CHRONIC KIDNEY DISEASE: ICD-10-CM

## 2021-04-26 PROCEDURE — 99999 PR PBB SHADOW E&M-EST. PATIENT-LVL III: CPT | Mod: PBBFAC,,, | Performed by: FAMILY MEDICINE

## 2021-04-26 PROCEDURE — 99999 PR PBB SHADOW E&M-EST. PATIENT-LVL III: ICD-10-PCS | Mod: PBBFAC,,, | Performed by: FAMILY MEDICINE

## 2021-04-26 PROCEDURE — 99214 PR OFFICE/OUTPT VISIT, EST, LEVL IV, 30-39 MIN: ICD-10-PCS | Mod: S$GLB,,, | Performed by: FAMILY MEDICINE

## 2021-04-26 PROCEDURE — 99214 OFFICE O/P EST MOD 30 MIN: CPT | Mod: S$GLB,,, | Performed by: FAMILY MEDICINE

## 2021-04-28 ENCOUNTER — PATIENT MESSAGE (OUTPATIENT)
Dept: RESEARCH | Facility: HOSPITAL | Age: 56
End: 2021-04-28

## 2021-08-17 ENCOUNTER — TELEPHONE (OUTPATIENT)
Dept: INTERNAL MEDICINE | Facility: CLINIC | Age: 56
End: 2021-08-17

## 2021-08-18 ENCOUNTER — TELEPHONE (OUTPATIENT)
Dept: INTERNAL MEDICINE | Facility: CLINIC | Age: 56
End: 2021-08-18

## 2021-10-04 ENCOUNTER — OFFICE VISIT (OUTPATIENT)
Dept: INTERNAL MEDICINE | Facility: CLINIC | Age: 56
End: 2021-10-04
Payer: COMMERCIAL

## 2021-10-04 ENCOUNTER — HOSPITAL ENCOUNTER (OUTPATIENT)
Dept: RADIOLOGY | Facility: HOSPITAL | Age: 56
Discharge: HOME | End: 2021-10-04
Attending: FAMILY MEDICINE
Payer: COMMERCIAL

## 2021-10-04 VITALS
DIASTOLIC BLOOD PRESSURE: 88 MMHG | HEART RATE: 80 BPM | BODY MASS INDEX: 36.87 KG/M2 | SYSTOLIC BLOOD PRESSURE: 128 MMHG | WEIGHT: 195.31 LBS | HEIGHT: 61 IN | TEMPERATURE: 98 F

## 2021-10-04 DIAGNOSIS — Z00.00 ANNUAL PHYSICAL EXAM: Primary | ICD-10-CM

## 2021-10-04 DIAGNOSIS — M25.562 CHRONIC PAIN OF BOTH KNEES: ICD-10-CM

## 2021-10-04 DIAGNOSIS — M25.561 CHRONIC PAIN OF BOTH KNEES: ICD-10-CM

## 2021-10-04 DIAGNOSIS — Z12.31 ENCOUNTER FOR SCREENING MAMMOGRAM FOR MALIGNANT NEOPLASM OF BREAST: ICD-10-CM

## 2021-10-04 DIAGNOSIS — M79.605 BILATERAL LEG PAIN: ICD-10-CM

## 2021-10-04 DIAGNOSIS — N18.31 STAGE 3A CHRONIC KIDNEY DISEASE: ICD-10-CM

## 2021-10-04 DIAGNOSIS — G89.29 CHRONIC PAIN OF BOTH KNEES: ICD-10-CM

## 2021-10-04 DIAGNOSIS — M79.604 BILATERAL LEG PAIN: ICD-10-CM

## 2021-10-04 DIAGNOSIS — Z12.11 COLON CANCER SCREENING: ICD-10-CM

## 2021-10-04 PROCEDURE — 99999 PR PBB SHADOW E&M-EST. PATIENT-LVL III: CPT | Mod: PBBFAC,,, | Performed by: FAMILY MEDICINE

## 2021-10-04 PROCEDURE — 99999 PR PBB SHADOW E&M-EST. PATIENT-LVL III: ICD-10-PCS | Mod: PBBFAC,,, | Performed by: FAMILY MEDICINE

## 2021-10-04 PROCEDURE — 73562 XR KNEE ORTHO BILAT: ICD-10-PCS | Mod: 26,50,, | Performed by: RADIOLOGY

## 2021-10-04 PROCEDURE — 73562 X-RAY EXAM OF KNEE 3: CPT | Mod: TC,50,FY,PO

## 2021-10-04 PROCEDURE — 99214 PR OFFICE/OUTPT VISIT, EST, LEVL IV, 30-39 MIN: ICD-10-PCS | Mod: S$GLB,,, | Performed by: FAMILY MEDICINE

## 2021-10-04 PROCEDURE — 73562 X-RAY EXAM OF KNEE 3: CPT | Mod: 26,50,, | Performed by: RADIOLOGY

## 2021-10-04 PROCEDURE — 99214 OFFICE O/P EST MOD 30 MIN: CPT | Mod: S$GLB,,, | Performed by: FAMILY MEDICINE

## 2021-10-18 ENCOUNTER — LAB VISIT (OUTPATIENT)
Dept: LAB | Facility: HOSPITAL | Age: 56
End: 2021-10-18
Attending: FAMILY MEDICINE
Payer: COMMERCIAL

## 2021-10-18 DIAGNOSIS — R73.03 PREDIABETES: ICD-10-CM

## 2021-10-18 DIAGNOSIS — N18.31 STAGE 3A CHRONIC KIDNEY DISEASE: ICD-10-CM

## 2021-10-18 LAB
ANION GAP SERPL CALC-SCNC: 7 MMOL/L (ref 8–16)
BUN SERPL-MCNC: 20 MG/DL (ref 6–20)
CALCIUM SERPL-MCNC: 10.1 MG/DL (ref 8.7–10.5)
CHLORIDE SERPL-SCNC: 103 MMOL/L (ref 95–110)
CO2 SERPL-SCNC: 27 MMOL/L (ref 23–29)
CREAT SERPL-MCNC: 1.4 MG/DL (ref 0.5–1.4)
EST. GFR  (AFRICAN AMERICAN): 48.1 ML/MIN/1.73 M^2
EST. GFR  (NON AFRICAN AMERICAN): 41.7 ML/MIN/1.73 M^2
ESTIMATED AVG GLUCOSE: 117 MG/DL (ref 68–131)
GLUCOSE SERPL-MCNC: 73 MG/DL (ref 70–110)
HBA1C MFR BLD: 5.7 % (ref 4–5.6)
POTASSIUM SERPL-SCNC: 4.8 MMOL/L (ref 3.5–5.1)
SODIUM SERPL-SCNC: 137 MMOL/L (ref 136–145)

## 2021-10-18 PROCEDURE — 80048 BASIC METABOLIC PNL TOTAL CA: CPT | Performed by: FAMILY MEDICINE

## 2021-10-18 PROCEDURE — 36415 COLL VENOUS BLD VENIPUNCTURE: CPT | Mod: PO | Performed by: FAMILY MEDICINE

## 2021-10-18 PROCEDURE — 83036 HEMOGLOBIN GLYCOSYLATED A1C: CPT | Performed by: FAMILY MEDICINE

## 2021-11-22 ENCOUNTER — TELEPHONE (OUTPATIENT)
Dept: INTERNAL MEDICINE | Facility: CLINIC | Age: 56
End: 2021-11-22
Payer: COMMERCIAL

## 2021-11-22 ENCOUNTER — HOSPITAL ENCOUNTER (OUTPATIENT)
Dept: RADIOLOGY | Facility: HOSPITAL | Age: 56
Discharge: HOME | End: 2021-11-22
Attending: FAMILY MEDICINE
Payer: COMMERCIAL

## 2021-11-22 DIAGNOSIS — Z12.31 ENCOUNTER FOR SCREENING MAMMOGRAM FOR MALIGNANT NEOPLASM OF BREAST: ICD-10-CM

## 2021-11-22 PROCEDURE — 77063 BREAST TOMOSYNTHESIS BI: CPT | Mod: 26,,, | Performed by: RADIOLOGY

## 2021-11-22 PROCEDURE — 77067 SCR MAMMO BI INCL CAD: CPT | Mod: TC

## 2021-11-22 PROCEDURE — 77063 MAMMO DIGITAL SCREENING BILAT WITH TOMO: ICD-10-PCS | Mod: 26,,, | Performed by: RADIOLOGY

## 2021-11-22 PROCEDURE — 77067 SCR MAMMO BI INCL CAD: CPT | Mod: 26,,, | Performed by: RADIOLOGY

## 2021-11-22 PROCEDURE — 77067 MAMMO DIGITAL SCREENING BILAT WITH TOMO: ICD-10-PCS | Mod: 26,,, | Performed by: RADIOLOGY

## 2021-12-29 RX ORDER — SODIUM, POTASSIUM,MAG SULFATES 17.5-3.13G
1 SOLUTION, RECONSTITUTED, ORAL ORAL DAILY
Qty: 1 KIT | Refills: 0 | Status: SHIPPED | OUTPATIENT
Start: 2021-12-29 | End: 2021-12-31

## 2022-01-12 ENCOUNTER — PATIENT MESSAGE (OUTPATIENT)
Dept: ENDOSCOPY | Facility: HOSPITAL | Age: 57
End: 2022-01-12
Payer: COMMERCIAL

## 2022-01-12 RX ORDER — POLYETHYLENE GLYCOL 3350, SODIUM SULFATE ANHYDROUS, SODIUM BICARBONATE, SODIUM CHLORIDE, POTASSIUM CHLORIDE 236; 22.74; 6.74; 5.86; 2.97 G/4L; G/4L; G/4L; G/4L; G/4L
4 POWDER, FOR SOLUTION ORAL ONCE
Qty: 4000 ML | Refills: 0 | Status: SHIPPED | OUTPATIENT
Start: 2022-01-12 | End: 2022-01-12

## 2022-02-03 ENCOUNTER — PATIENT MESSAGE (OUTPATIENT)
Dept: ENDOSCOPY | Facility: HOSPITAL | Age: 57
End: 2022-02-03
Payer: COMMERCIAL

## 2022-04-21 ENCOUNTER — LAB VISIT (OUTPATIENT)
Dept: LAB | Facility: HOSPITAL | Age: 57
End: 2022-04-21
Attending: FAMILY MEDICINE
Payer: COMMERCIAL

## 2022-04-21 ENCOUNTER — OFFICE VISIT (OUTPATIENT)
Dept: INTERNAL MEDICINE | Facility: CLINIC | Age: 57
End: 2022-04-21
Payer: COMMERCIAL

## 2022-04-21 VITALS
WEIGHT: 204.13 LBS | HEART RATE: 66 BPM | BODY MASS INDEX: 38.54 KG/M2 | HEIGHT: 61 IN | TEMPERATURE: 97 F | DIASTOLIC BLOOD PRESSURE: 80 MMHG | SYSTOLIC BLOOD PRESSURE: 124 MMHG | OXYGEN SATURATION: 99 %

## 2022-04-21 DIAGNOSIS — D64.9 ANEMIA, UNSPECIFIED TYPE: ICD-10-CM

## 2022-04-21 DIAGNOSIS — R73.03 PREDIABETES: ICD-10-CM

## 2022-04-21 DIAGNOSIS — Z12.11 COLON CANCER SCREENING: ICD-10-CM

## 2022-04-21 DIAGNOSIS — R73.09 ABNORMAL GLUCOSE: ICD-10-CM

## 2022-04-21 DIAGNOSIS — I10 HYPERTENSION, UNSPECIFIED TYPE: ICD-10-CM

## 2022-04-21 DIAGNOSIS — I10 ESSENTIAL HYPERTENSION: ICD-10-CM

## 2022-04-21 DIAGNOSIS — M79.669 PAIN IN SHIN, UNSPECIFIED LATERALITY: ICD-10-CM

## 2022-04-21 DIAGNOSIS — L65.9 HAIR LOSS: Primary | ICD-10-CM

## 2022-04-21 DIAGNOSIS — L65.9 HAIR LOSS: ICD-10-CM

## 2022-04-21 PROCEDURE — 84443 ASSAY THYROID STIM HORMONE: CPT | Performed by: FAMILY MEDICINE

## 2022-04-21 PROCEDURE — 84466 ASSAY OF TRANSFERRIN: CPT | Performed by: FAMILY MEDICINE

## 2022-04-21 PROCEDURE — 99999 PR PBB SHADOW E&M-EST. PATIENT-LVL IV: ICD-10-PCS | Mod: PBBFAC,,, | Performed by: FAMILY MEDICINE

## 2022-04-21 PROCEDURE — 36415 COLL VENOUS BLD VENIPUNCTURE: CPT | Mod: PO | Performed by: FAMILY MEDICINE

## 2022-04-21 PROCEDURE — 80053 COMPREHEN METABOLIC PANEL: CPT | Performed by: FAMILY MEDICINE

## 2022-04-21 PROCEDURE — 99214 OFFICE O/P EST MOD 30 MIN: CPT | Mod: S$GLB,,, | Performed by: FAMILY MEDICINE

## 2022-04-21 PROCEDURE — 99214 PR OFFICE/OUTPT VISIT, EST, LEVL IV, 30-39 MIN: ICD-10-PCS | Mod: S$GLB,,, | Performed by: FAMILY MEDICINE

## 2022-04-21 PROCEDURE — 82728 ASSAY OF FERRITIN: CPT | Performed by: FAMILY MEDICINE

## 2022-04-21 PROCEDURE — 99999 PR PBB SHADOW E&M-EST. PATIENT-LVL IV: CPT | Mod: PBBFAC,,, | Performed by: FAMILY MEDICINE

## 2022-04-21 PROCEDURE — 85025 COMPLETE CBC W/AUTO DIFF WBC: CPT | Performed by: FAMILY MEDICINE

## 2022-04-21 PROCEDURE — 83036 HEMOGLOBIN GLYCOSYLATED A1C: CPT | Performed by: FAMILY MEDICINE

## 2022-04-21 RX ORDER — OLMESARTAN MEDOXOMIL AND HYDROCHLOROTHIAZIDE 40/25 40; 25 MG/1; MG/1
1 TABLET ORAL DAILY
Qty: 90 TABLET | Refills: 2 | Status: SHIPPED | OUTPATIENT
Start: 2022-04-21 | End: 2023-05-25

## 2022-04-21 RX ORDER — METFORMIN HYDROCHLORIDE 500 MG/1
500 TABLET, EXTENDED RELEASE ORAL 2 TIMES DAILY WITH MEALS
Qty: 180 TABLET | Refills: 2 | Status: SHIPPED | OUTPATIENT
Start: 2022-04-21 | End: 2023-05-25

## 2022-04-21 NOTE — TELEPHONE ENCOUNTER
No new care gaps identified.  Powered by [employer]. Reference number: [id number].   4/21/2022 10:24:53 AM CDT

## 2022-04-21 NOTE — PROGRESS NOTES
"Subjective:      Patient ID: Kassy Benjamin is a 56 y.o. female.    Chief Complaint: Follow-up and Leg Pain    HPI 56 y.o.   female patient with a PMHx of HLD, HTN, Goiter, cervical radiculopathy,  presents to clinic for follow up. The patient was last seen on October 4, 2021      Today she reports that she is doing okay. She does endorse some leg pain.  This has been going on for >6 mos.  She describes pain being in her shins BL.  She works at [employer]//stands on concrete.  Topical meds help.  Worse at night/upon waking at times.  Wearing supportive shoes.    Patient also reports that she has noticed frequent hair loss.  Has balding on top of her head.  Stopped her metformin//worried it would be cause.  Of note, she has been on meds for a long time.  Denies recent Covid infection.  Patient denies chest pain, SOB and bowel changes.      Past Medical History:   Diagnosis Date    B12 deficiency anemia     Cervical radiculopathy     Goiter     Hyperlipidemia     Hypertension      Family History   Problem Relation Age of Onset    Hypertension Son     Colon cancer Mother 65    Diabetes Father     Breast cancer Maternal Cousin      Past Surgical History:   Procedure Laterality Date    BREAST BIOPSY Right     3/2014 at [facility]//neg     Social History     Tobacco Use    Smoking status: Never Smoker    Smokeless tobacco: Never Used   Substance Use Topics    Alcohol use: No    Drug use: No       /80   Pulse 66   Temp 97.2 °F (36.2 °C)   Ht 5' 1" (1.549 m)   Wt 92.6 kg (204 lb 2.3 oz)   LMP 07/25/2013   SpO2 99%   BMI 38.57 kg/m²     Review of Systems   Constitutional: Negative for activity change, appetite change, chills, diaphoresis, fatigue, fever and unexpected weight change.   HENT: Negative for hearing loss, rhinorrhea, tinnitus and trouble swallowing.    Eyes: Negative for discharge and visual disturbance.   Respiratory: Negative for cough, chest tightness, shortness of breath and " wheezing.    Cardiovascular: Negative for chest pain, palpitations and leg swelling.   Gastrointestinal: Negative for abdominal distention, abdominal pain, blood in stool, constipation, diarrhea and vomiting.   Endocrine: Negative for polydipsia and polyuria.        Hair loss   Genitourinary: Negative for difficulty urinating, dysuria, frequency, hematuria, menstrual problem and urgency.   Musculoskeletal: Positive for arthralgias. Negative for back pain, joint swelling and neck pain.   Skin: Negative for color change and rash.   Neurological: Negative for weakness and headaches.   Hematological: Negative for adenopathy.   Psychiatric/Behavioral: Negative for confusion, decreased concentration and dysphoric mood.       Objective:     Physical Exam  Vitals and nursing note reviewed.   Constitutional:       General: She is not in acute distress.  HENT:      Right Ear: External ear normal.      Left Ear: External ear normal.      Nose: Nose normal.   Eyes:      Conjunctiva/sclera: Conjunctivae normal.      Pupils: Pupils are equal, round, and reactive to light.   Cardiovascular:      Rate and Rhythm: Normal rate and regular rhythm.      Heart sounds: Normal heart sounds.   Pulmonary:      Effort: Pulmonary effort is normal. No respiratory distress.      Breath sounds: Normal breath sounds. No wheezing or rales.   Abdominal:      General: Bowel sounds are normal. There is no distension.      Palpations: Abdomen is soft.      Tenderness: There is no abdominal tenderness. There is no guarding.   Musculoskeletal:      Cervical back: Normal range of motion and neck supple.      Right lower leg: No edema.      Left lower leg: No edema.   Skin:     General: Skin is warm and dry.      Findings: No rash.      Comments: Scalp with hair loss//balding on top   Neurological:      Mental Status: She is alert and oriented to person, place, and time.   Psychiatric:         Behavior: Behavior normal.         Thought Content: Thought  content normal.         Judgment: Judgment normal.         Lab Results   Component Value Date    WBC 6.52 10/04/2021    HGB 11.1 (L) 10/04/2021    HCT 35.7 (L) 10/04/2021     10/04/2021    CHOL 178 10/04/2021    TRIG 94 10/04/2021    HDL 42 10/04/2021    ALT 40 10/04/2021    AST 33 10/04/2021     10/18/2021    K 4.8 10/18/2021     10/18/2021    CREATININE 1.4 10/18/2021    BUN 20 10/18/2021    CO2 27 10/18/2021    TSH 1.899 04/19/2021    HGBA1C 5.7 (H) 10/18/2021       Assessment:     1. Hair loss    2. Hypertension, unspecified type    3. Abnormal glucose    4. Colon cancer screening    5. Anemia, unspecified type    6. Pain in shin, unspecified laterality         Plan:     Hair loss  -     TSH; Future; Expected date: 04/21/2022    Hypertension, unspecified type  -     Comprehensive Metabolic Panel; Future; Expected date: 04/21/2022    Abnormal glucose  -     Hemoglobin A1C; Future; Expected date: 04/21/2022    Colon cancer screening  -     Ambulatory referral/consult to Endo Procedure ; Future; Expected date: 04/22/2022    Anemia, unspecified type  -     CBC Auto Differential; Future; Expected date: 04/21/2022  -     Ferritin; Future; Expected date: 04/21/2022  -     Iron and TIBC; Future; Expected date: 04/21/2022    Pain in shin, unspecified laterality  -     Ambulatory referral/consult to Orthopedics; Future; Expected date: 04/28/2022        Vitals reviewed and stable. BP within normal range at 124/80.  We will need to recheck labs to further assess symptoms.     Patient is also due for colon cancer screening.     She will be referred to Ortho for bilateral leg pain.     Patient chronic conditions overall stable with no medications changes. She will continue with current regimen.     May need to see Derm for hair//not convinced that it is med related.    Questions and concerns addressed.          Documentation entered by Keren Jacobs, acting as scribe for Dr. Xiang Velasquez.  04/21/2022 9:49 AM.

## 2022-04-22 ENCOUNTER — PATIENT MESSAGE (OUTPATIENT)
Dept: INTERNAL MEDICINE | Facility: CLINIC | Age: 57
End: 2022-04-22
Payer: COMMERCIAL

## 2022-04-22 LAB
ALBUMIN SERPL BCP-MCNC: 3.6 G/DL (ref 3.5–5.2)
ALP SERPL-CCNC: 91 U/L (ref 55–135)
ALT SERPL W/O P-5'-P-CCNC: 16 U/L (ref 10–44)
ANION GAP SERPL CALC-SCNC: 9 MMOL/L (ref 8–16)
AST SERPL-CCNC: 30 U/L (ref 10–40)
BASOPHILS # BLD AUTO: 0.07 K/UL (ref 0–0.2)
BASOPHILS NFR BLD: 0.9 % (ref 0–1.9)
BILIRUB SERPL-MCNC: 0.5 MG/DL (ref 0.1–1)
BUN SERPL-MCNC: 18 MG/DL (ref 6–20)
CALCIUM SERPL-MCNC: 9.8 MG/DL (ref 8.7–10.5)
CHLORIDE SERPL-SCNC: 108 MMOL/L (ref 95–110)
CO2 SERPL-SCNC: 25 MMOL/L (ref 23–29)
CREAT SERPL-MCNC: 1.2 MG/DL (ref 0.5–1.4)
DIFFERENTIAL METHOD: ABNORMAL
EOSINOPHIL # BLD AUTO: 0.2 K/UL (ref 0–0.5)
EOSINOPHIL NFR BLD: 2 % (ref 0–8)
ERYTHROCYTE [DISTWIDTH] IN BLOOD BY AUTOMATED COUNT: 13.2 % (ref 11.5–14.5)
EST. GFR  (AFRICAN AMERICAN): 58 ML/MIN/1.73 M^2
EST. GFR  (NON AFRICAN AMERICAN): 50.3 ML/MIN/1.73 M^2
ESTIMATED AVG GLUCOSE: 123 MG/DL (ref 68–131)
FERRITIN SERPL-MCNC: 214 NG/ML (ref 20–300)
GLUCOSE SERPL-MCNC: 64 MG/DL (ref 70–110)
HBA1C MFR BLD: 5.9 % (ref 4–5.6)
HCT VFR BLD AUTO: 36.4 % (ref 37–48.5)
HGB BLD-MCNC: 11 G/DL (ref 12–16)
IMM GRANULOCYTES # BLD AUTO: 0.02 K/UL (ref 0–0.04)
IMM GRANULOCYTES NFR BLD AUTO: 0.3 % (ref 0–0.5)
IRON SERPL-MCNC: 94 UG/DL (ref 30–160)
LYMPHOCYTES # BLD AUTO: 3 K/UL (ref 1–4.8)
LYMPHOCYTES NFR BLD: 38.1 % (ref 18–48)
MCH RBC QN AUTO: 30.1 PG (ref 27–31)
MCHC RBC AUTO-ENTMCNC: 30.2 G/DL (ref 32–36)
MCV RBC AUTO: 100 FL (ref 82–98)
MONOCYTES # BLD AUTO: 0.5 K/UL (ref 0.3–1)
MONOCYTES NFR BLD: 6 % (ref 4–15)
NEUTROPHILS # BLD AUTO: 4.1 K/UL (ref 1.8–7.7)
NEUTROPHILS NFR BLD: 52.7 % (ref 38–73)
NRBC BLD-RTO: 0 /100 WBC
PLATELET # BLD AUTO: 254 K/UL (ref 150–450)
PMV BLD AUTO: 10.2 FL (ref 9.2–12.9)
POTASSIUM SERPL-SCNC: 5 MMOL/L (ref 3.5–5.1)
PROT SERPL-MCNC: 7.6 G/DL (ref 6–8.4)
RBC # BLD AUTO: 3.65 M/UL (ref 4–5.4)
SATURATED IRON: 29 % (ref 20–50)
SODIUM SERPL-SCNC: 142 MMOL/L (ref 136–145)
TOTAL IRON BINDING CAPACITY: 323 UG/DL (ref 250–450)
TRANSFERRIN SERPL-MCNC: 218 MG/DL (ref 200–375)
TSH SERPL DL<=0.005 MIU/L-ACNC: 1.59 UIU/ML (ref 0.4–4)
WBC # BLD AUTO: 7.85 K/UL (ref 3.9–12.7)

## 2022-04-28 ENCOUNTER — TELEPHONE (OUTPATIENT)
Dept: ORTHOPEDICS | Facility: CLINIC | Age: 57
End: 2022-04-28
Payer: COMMERCIAL

## 2022-04-28 NOTE — TELEPHONE ENCOUNTER
Spoke with pt about scheduling an ortho visit from her referral. Was able to schedule her with Dr. Watters in [facility] since she had time constraints other days of the week with work. Pt aware of location, ok'd date and time

## 2022-05-05 ENCOUNTER — TELEPHONE (OUTPATIENT)
Dept: SPORTS MEDICINE | Facility: CLINIC | Age: 57
End: 2022-05-05
Payer: COMMERCIAL

## 2022-05-19 DIAGNOSIS — M79.662 PAIN IN BOTH LOWER LEGS: Primary | ICD-10-CM

## 2022-05-19 DIAGNOSIS — M79.661 PAIN IN BOTH LOWER LEGS: Primary | ICD-10-CM

## 2022-05-23 ENCOUNTER — TELEPHONE (OUTPATIENT)
Dept: ORTHOPEDICS | Facility: CLINIC | Age: 57
End: 2022-05-23
Payer: COMMERCIAL

## 2022-05-23 NOTE — TELEPHONE ENCOUNTER
----- Message from Val Degroot sent at 5/23/2022  1:30 PM CDT -----  Contact: [phone]  Patient called, would like to rescheduled her appointment from today 05/23/22 . Please call and advise. Thank you.

## 2022-06-06 ENCOUNTER — HOSPITAL ENCOUNTER (OUTPATIENT)
Dept: RADIOLOGY | Facility: HOSPITAL | Age: 57
Discharge: HOME | End: 2022-06-06
Attending: FAMILY MEDICINE
Payer: COMMERCIAL

## 2022-06-06 ENCOUNTER — OFFICE VISIT (OUTPATIENT)
Dept: ORTHOPEDICS | Facility: CLINIC | Age: 57
End: 2022-06-06
Payer: COMMERCIAL

## 2022-06-06 VITALS — HEIGHT: 61 IN | BODY MASS INDEX: 38.51 KG/M2 | WEIGHT: 204 LBS

## 2022-06-06 DIAGNOSIS — M89.9 BONE LESION: Primary | ICD-10-CM

## 2022-06-06 DIAGNOSIS — M79.661 PAIN IN BOTH LOWER LEGS: ICD-10-CM

## 2022-06-06 DIAGNOSIS — M79.669 PAIN IN SHIN, UNSPECIFIED LATERALITY: ICD-10-CM

## 2022-06-06 DIAGNOSIS — M79.662 PAIN IN BOTH LOWER LEGS: ICD-10-CM

## 2022-06-06 PROCEDURE — 73590 X-RAY EXAM OF LOWER LEG: CPT | Mod: 26,50,, | Performed by: RADIOLOGY

## 2022-06-06 PROCEDURE — 99999 PR PBB SHADOW E&M-EST. PATIENT-LVL III: ICD-10-PCS | Mod: PBBFAC,,, | Performed by: FAMILY MEDICINE

## 2022-06-06 PROCEDURE — 99203 PR OFFICE/OUTPT VISIT, NEW, LEVL III, 30-44 MIN: ICD-10-PCS | Mod: S$GLB,,, | Performed by: FAMILY MEDICINE

## 2022-06-06 PROCEDURE — 99999 PR PBB SHADOW E&M-EST. PATIENT-LVL III: CPT | Mod: PBBFAC,,, | Performed by: FAMILY MEDICINE

## 2022-06-06 PROCEDURE — 73590 X-RAY EXAM OF LOWER LEG: CPT | Mod: TC,50

## 2022-06-06 PROCEDURE — 73590 XR TIBIA FIBULA BILATERAL: ICD-10-PCS | Mod: 26,50,, | Performed by: RADIOLOGY

## 2022-06-06 PROCEDURE — 99203 OFFICE O/P NEW LOW 30 MIN: CPT | Mod: S$GLB,,, | Performed by: FAMILY MEDICINE

## 2022-06-06 NOTE — PROGRESS NOTES
Subjective:     Patient ID: Kassy Benjamin is a 56 y.o. female.    Chief Complaint: Pain of the Left Lower Leg and Pain of the Right Lower Leg      HPI: Patient is a  who is being seen for bilateral shin pain that has been present for the last 4 to 5 months. Rating pain 4/10 during today's visit, but states pain is 9/10 at its worst. Using icy hot and taking tylenol as needed for pain relief.   Pain is greater in the left shin than the right.  Does not have swelling abnormal redness or warmth in this area.    Occasionally applies an over-the-counter ointment under takes Tylenol.  Just bought new balance shoes would seem to be helping a little.  Past Medical History:   Diagnosis Date    B12 deficiency anemia     Cervical radiculopathy     Goiter     Hyperlipidemia     Hypertension      Past Surgical History:   Procedure Laterality Date    BREAST BIOPSY Right     3/2014 at [facility]//neg     Family History   Problem Relation Age of Onset    Hypertension Son     Colon cancer Mother 65    Diabetes Father     Breast cancer Maternal Cousin      Social History     Socioeconomic History    Marital status: Single    Number of children: 3   Occupational History    Occupation:    Tobacco Use    Smoking status: Never Smoker    Smokeless tobacco: Never Used   Substance and Sexual Activity    Alcohol use: No    Drug use: No    Sexual activity: Yes     Partners: Male       Current Outpatient Medications:     metFORMIN (GLUCOPHAGE-XR) 500 MG ER 24hr tablet, Take 1 tablet (500 mg total) by mouth 2 (two) times daily with meals., Disp: 180 tablet, Rfl: 2    olmesartan-hydrochlorothiazide (BENICAR HCT) 40-25 mg per tablet, Take 1 tablet by mouth once daily., Disp: 90 tablet, Rfl: 2  Review of patient's allergies indicates:   Allergen Reactions    Codeine      Review of Systems   Constitutional: Negative for chills, fever and weight loss.   Respiratory: Negative for shortness of breath.     Cardiovascular: Negative for chest pain and palpitations.       Objective:   Body mass index is 38.55 kg/m².  There were no vitals filed for this visit.        Ortho/SPM Exam-alert and oriented well-nourished well-developed ambulatory in no acute distress    Respiratory effort appears normal    Bilateral lower legs-knee joints appear normal stable nontender to palpation without deformity    Patient points to the musculature just lateral to the anterior tibia in each lower leg and has mild diffuse soreness to palpation in this area  Has prominent musculature lateral to the tibia and including the peroneal musculature.    Full range of motion of the ankles and no unusual swelling redness or abnormal warmth.    Neurovascular intact    Psychiatric good affect and cognition    Plan-seems to be muscular strain of the lower legs and should be responsive Voltaren gel ice Tylenol.    In addition is an abnormality distal tibial on the right which will need an MRI with contrast to further evaluate.    There are no Patient Instructions on file for this visit.    IMAGING: X-Ray Tibia Fibula Bilateral  Narrative: EXAMINATION:  XR TIBIA FIBULA BILATERAL    CLINICAL HISTORY:  Pain in right lower leg    TECHNIQUE:  Frontal and lateral views of the bilateral lower legs    COMPARISON:  None    FINDINGS:  No fracture or dislocation.  There is ill-defined sclerosis with central lucency within the distal right tibia.  The surrounding soft tissues are unremarkable.  No radiopaque foreign body.  Impression: Ill-defined sclerosis with central lucency in the distal right tibia.  Consider dedicated radiographs/MRI with contrast for further evaluation.    Electronically signed by: Sean Argueta  Date:    06/06/2022  Time:    13:39       Radiographs / Imaging : Relevant imaging results reviewed by me and interpreted by me, discussed with the patient and / or family  -x-ray reviewed by me and agree with report      Assessment:     Encounter  Diagnoses   Name Primary?    Pain in shin, unspecified laterality     Bone lesion Yes        Plan:   Bone lesion    Pain in shin, unspecified laterality  -     Ambulatory referral/consult to Orthopedics        The patient verbalized good understanding of the medical issues discussed today and expressed appreciation for the care provided.  Patient was given the opportunity to ask questions and be an active participant in their medical care. Patient had no further questions or concerns at this time.     The patient was encouraged to participate in appropriate physical activity.      Wil Watters M.D.  [facility]        This note was dictated using voice recognition software and may have sound a like errors.

## 2022-06-10 DIAGNOSIS — M89.9 BONE LESION: Primary | ICD-10-CM

## 2022-06-17 ENCOUNTER — PATIENT MESSAGE (OUTPATIENT)
Dept: SPORTS MEDICINE | Facility: CLINIC | Age: 57
End: 2022-06-17
Payer: COMMERCIAL

## 2022-06-17 ENCOUNTER — TELEPHONE (OUTPATIENT)
Dept: SPORTS MEDICINE | Facility: CLINIC | Age: 57
End: 2022-06-17
Payer: COMMERCIAL

## 2022-06-17 DIAGNOSIS — M89.9 BONE LESION: Primary | ICD-10-CM

## 2022-06-17 NOTE — TELEPHONE ENCOUNTER
Attempted to inform patient of xray results and schedule mri.   ----- Message from Wil Watters MD sent at 6/16/2022  2:42 PM CDT -----  Order MRI distal right tibia.  X-ray shows an abnormal area in the right tibia ball but it probably is not anything serious at all but we will check MRI to be sure.

## 2022-07-01 ENCOUNTER — TELEPHONE (OUTPATIENT)
Dept: INTERNAL MEDICINE | Facility: CLINIC | Age: 57
End: 2022-07-01
Payer: COMMERCIAL

## 2022-07-01 NOTE — TELEPHONE ENCOUNTER
----- Message from Dimas Paredes sent at 7/1/2022  8:24 AM CDT -----  Pt would like a call back regarding rescheduling her colonoscopy. Please call .[phone]

## 2022-07-08 DIAGNOSIS — M89.9 BONE LESION: Primary | ICD-10-CM

## 2022-07-21 ENCOUNTER — PATIENT MESSAGE (OUTPATIENT)
Dept: SPORTS MEDICINE | Facility: CLINIC | Age: 57
End: 2022-07-21
Payer: COMMERCIAL

## 2022-07-21 ENCOUNTER — TELEPHONE (OUTPATIENT)
Dept: SPORTS MEDICINE | Facility: CLINIC | Age: 57
End: 2022-07-21
Payer: COMMERCIAL

## 2022-12-07 DIAGNOSIS — Z12.31 OTHER SCREENING MAMMOGRAM: ICD-10-CM

## 2022-12-12 ENCOUNTER — OFFICE VISIT (OUTPATIENT)
Dept: INTERNAL MEDICINE | Facility: CLINIC | Age: 57
End: 2022-12-12
Payer: COMMERCIAL

## 2022-12-12 ENCOUNTER — LAB VISIT (OUTPATIENT)
Dept: LAB | Facility: HOSPITAL | Age: 57
End: 2022-12-12
Attending: FAMILY MEDICINE
Payer: COMMERCIAL

## 2022-12-12 VITALS
SYSTOLIC BLOOD PRESSURE: 122 MMHG | TEMPERATURE: 97 F | DIASTOLIC BLOOD PRESSURE: 88 MMHG | HEIGHT: 61 IN | HEART RATE: 72 BPM | BODY MASS INDEX: 37.66 KG/M2 | WEIGHT: 199.5 LBS

## 2022-12-12 DIAGNOSIS — Z00.00 ANNUAL PHYSICAL EXAM: Primary | ICD-10-CM

## 2022-12-12 DIAGNOSIS — N18.31 STAGE 3A CHRONIC KIDNEY DISEASE: ICD-10-CM

## 2022-12-12 DIAGNOSIS — Z00.00 ANNUAL PHYSICAL EXAM: ICD-10-CM

## 2022-12-12 DIAGNOSIS — I10 HYPERTENSION, UNSPECIFIED TYPE: ICD-10-CM

## 2022-12-12 DIAGNOSIS — Z12.11 COLON CANCER SCREENING: ICD-10-CM

## 2022-12-12 DIAGNOSIS — R73.09 ABNORMAL GLUCOSE: ICD-10-CM

## 2022-12-12 LAB
ALBUMIN/CREAT UR: NORMAL UG/MG (ref 0–30)
CREAT UR-MCNC: 146 MG/DL (ref 15–325)
MICROALBUMIN UR DL<=1MG/L-MCNC: <5 UG/ML

## 2022-12-12 PROCEDURE — 99396 PR PREVENTIVE VISIT,EST,40-64: ICD-10-PCS | Mod: S$GLB,,, | Performed by: FAMILY MEDICINE

## 2022-12-12 PROCEDURE — 99999 PR PBB SHADOW E&M-EST. PATIENT-LVL IV: ICD-10-PCS | Mod: PBBFAC,,, | Performed by: FAMILY MEDICINE

## 2022-12-12 PROCEDURE — 99396 PREV VISIT EST AGE 40-64: CPT | Mod: S$GLB,,, | Performed by: FAMILY MEDICINE

## 2022-12-12 PROCEDURE — 99999 PR PBB SHADOW E&M-EST. PATIENT-LVL IV: CPT | Mod: PBBFAC,,, | Performed by: FAMILY MEDICINE

## 2022-12-12 PROCEDURE — 82043 UR ALBUMIN QUANTITATIVE: CPT | Performed by: FAMILY MEDICINE

## 2022-12-12 PROCEDURE — 82570 ASSAY OF URINE CREATININE: CPT | Performed by: FAMILY MEDICINE

## 2022-12-12 NOTE — PROGRESS NOTES
"Subjective:      Patient ID: Kassy Benjamin is a 57 y.o. female.    Chief Complaint: Annual Exam    HPI 57 y.o.   female patient with a PMHx of HLD, HTN, goiter, cervical radiculopathy presents to clinic for annual exam. The patient was last seen on April 21, 2022      Today she reports she is in need of updated orders for colonoscopy because she did not get a call to schedule. Patient otherwise without complaints. Denies SOB, chest pain, and bowel changes.         Past Medical History:   Diagnosis Date    B12 deficiency anemia     Cervical radiculopathy     Goiter     Hyperlipidemia     Hypertension      Family History   Problem Relation Age of Onset    Hypertension Son     Colon cancer Mother 65    Diabetes Father     Breast cancer Maternal Cousin      Past Surgical History:   Procedure Laterality Date    BREAST BIOPSY Right     3/2014 at [facility]//neg     Social History     Tobacco Use    Smoking status: Never    Smokeless tobacco: Never   Substance Use Topics    Alcohol use: No    Drug use: No       /88   Pulse 72   Temp 97.3 °F (36.3 °C) (Temporal)   Ht 5' 1" (1.549 m)   Wt 90.5 kg (199 lb 8.3 oz)   LMP 07/25/2013   BMI 37.70 kg/m²     Review of Systems   Constitutional:  Negative for activity change, appetite change, chills, diaphoresis, fatigue, fever and unexpected weight change.   HENT:  Negative for congestion, ear pain, hearing loss, postnasal drip, rhinorrhea, sinus pain and sore throat.    Eyes:  Negative for pain, discharge, itching and visual disturbance.   Respiratory:  Negative for cough, chest tightness, shortness of breath and wheezing.    Cardiovascular:  Negative for chest pain, palpitations and leg swelling.   Gastrointestinal:  Negative for abdominal pain, constipation, diarrhea, nausea and vomiting.   Endocrine: Negative for polydipsia and polyuria.   Genitourinary:  Negative for difficulty urinating, dysuria, flank pain, frequency, menstrual problem, pelvic pain and " urgency.   Musculoskeletal:  Negative for arthralgias, back pain, joint swelling, myalgias and neck pain.   Skin:  Negative for color change and rash.   Neurological:  Negative for dizziness, weakness, light-headedness and headaches.   Hematological:  Negative for adenopathy.   Psychiatric/Behavioral:  Negative for confusion, decreased concentration and dysphoric mood.      Objective:     Physical Exam  Vitals and nursing note reviewed.   Constitutional:       General: She is not in acute distress.  HENT:      Right Ear: External ear normal.      Left Ear: External ear normal.      Nose: Nose normal.   Eyes:      Conjunctiva/sclera: Conjunctivae normal.      Pupils: Pupils are equal, round, and reactive to light.   Neck:      Vascular: No carotid bruit.   Cardiovascular:      Rate and Rhythm: Normal rate and regular rhythm.      Heart sounds: Normal heart sounds.   Pulmonary:      Effort: Pulmonary effort is normal. No respiratory distress.      Breath sounds: Normal breath sounds. No wheezing or rales.   Abdominal:      General: Bowel sounds are normal. There is no distension.      Palpations: Abdomen is soft.      Tenderness: There is no abdominal tenderness. There is no guarding.   Musculoskeletal:      Cervical back: Normal range of motion and neck supple.      Right lower leg: No edema.      Left lower leg: No edema.   Skin:     General: Skin is warm and dry.      Findings: No rash.   Neurological:      Mental Status: She is alert and oriented to person, place, and time.   Psychiatric:         Behavior: Behavior normal.         Thought Content: Thought content normal.         Judgment: Judgment normal.       Lab Results   Component Value Date    WBC 7.85 04/21/2022    HGB 11.0 (L) 04/21/2022    HCT 36.4 (L) 04/21/2022     04/21/2022    CHOL 178 10/04/2021    TRIG 94 10/04/2021    HDL 42 10/04/2021    ALT 16 04/21/2022    AST 30 04/21/2022     04/21/2022    K 5.0 04/21/2022     04/21/2022     CREATININE 1.2 04/21/2022    BUN 18 04/21/2022    CO2 25 04/21/2022    TSH 1.589 04/21/2022    HGBA1C 5.9 (H) 04/21/2022       Assessment:     1. Annual physical exam    2. Hypertension, unspecified type    3. Abnormal glucose    4. Stage 3a chronic kidney disease    5. Colon cancer screening         Plan:     Annual physical exam  -     CBC Auto Differential; Future; Expected date: 12/12/2022  -     Comprehensive Metabolic Panel; Future; Expected date: 12/12/2022  -     Hemoglobin A1C; Future; Expected date: 12/12/2022  -     Lipid Panel; Future; Expected date: 12/12/2022  -     TSH; Future; Expected date: 12/12/2022  -     Microalbumin/Creatinine Ratio, Urine; Future; Expected date: 12/12/2022    Hypertension, unspecified type    Abnormal glucose    Stage 3a chronic kidney disease    Colon cancer screening  -     Ambulatory referral/consult to Endo Procedure ; Future; Expected date: 12/13/2022        Vitals reviewed. BP within normal range at 122/88.    Fasting labs will be updated today.  Orders placed for colonoscopy and mammogram  Patient declines the influenza vaccine.  She is educated on the shingles vaccine series. Mammogram is due, orders placed.  Patient overall doing well.  Questions and concerns addressed.  Follow-up in 6 months    Recommend getting MRI leg per Ortho//I can send valium if needed    Documentation entered by Festus Xie, acting as scribe for Dr. Xiang Velasquez. 12/12/2022 10:05 AM.

## 2023-01-09 ENCOUNTER — HOSPITAL ENCOUNTER (OUTPATIENT)
Dept: PREADMISSION TESTING | Facility: HOSPITAL | Age: 58
Discharge: HOME | End: 2023-01-09
Payer: COMMERCIAL

## 2023-01-09 DIAGNOSIS — Z12.11 COLON CANCER SCREENING: ICD-10-CM

## 2023-01-23 ENCOUNTER — TELEPHONE (OUTPATIENT)
Dept: PREADMISSION TESTING | Facility: HOSPITAL | Age: 58
End: 2023-01-23
Payer: COMMERCIAL

## 2023-01-23 DIAGNOSIS — Z12.11 ENCOUNTER FOR SCREENING COLONOSCOPY: Primary | ICD-10-CM

## 2023-01-31 NOTE — PROGRESS NOTES
Colonoscopy PAT call completed.   Prep reviewed. Begin low fiber diet 5 days prior to procedure date. Begin clear liquids when wake up on Sunday, at least 1 - 2 gallons. No red or purple coloring. No pulp. OTC meds discussed. Golytely at 6pm/5am then nothing by mouth until after procedure, except BP meds with sip water only.  No gum, mints, lozenges, cigarettes, tobacco, etc.   Updated prep instructions sent via  portal and email per pt request.   Call if not clear. Dress comfortable. No jewelry/piercings.   [facility] [address] [facility], next to [facility].   Pt verbalized understanding of all prep instructions and denies questions.   Please call for any questions prior to procedure date. [phone]

## 2023-02-03 ENCOUNTER — ANESTHESIA EVENT (OUTPATIENT)
Dept: ENDOSCOPY | Facility: HOSPITAL | Age: 58
End: 2023-02-03
Payer: COMMERCIAL

## 2023-02-03 NOTE — ANESTHESIA PREPROCEDURE EVALUATION
02/03/2023  Kassy Benjamin is a 57 y.o., female.  Patient Active Problem List   Diagnosis    HTN (hypertension)    Abnormal mammogram    Pain in shin    Bone lesion     Past Surgical History:   Procedure Laterality Date    BREAST BIOPSY Right     3/2014 at [facility]//neg           Pre-op Assessment    I have reviewed the Patient Summary Reports.     I have reviewed the Nursing Notes. I have reviewed the NPO Status.   I have reviewed the Medications.     Review of Systems  Anesthesia Hx:  No problems with previous Anesthesia  Denies Family Hx of Anesthesia complications.   Denies Personal Hx of Anesthesia complications.   Social:  Non-Smoker, No Alcohol Use    Hematology/Oncology:  Hematology Normal   Oncology Normal     EENT/Dental:EENT/Dental Normal   Cardiovascular:   Exercise tolerance: good Hypertension, well controlled Denies MI.  Denies CAD.     Denies Angina.    Pulmonary:  Pulmonary Normal    Renal/:  Renal/ Normal     Hepatic/GI:   Bowel Prep.    Musculoskeletal:  Musculoskeletal Normal    Neurological:   Neuromuscular Disease, Cervical radiculopathy   Endocrine:  Obesity / BMI > 30  Dermatological:  Skin Normal    Psych:  Psychiatric Normal           Physical Exam  General: Well nourished, Cooperative, Alert and Oriented    Airway:  Mallampati: II   Mouth Opening: Normal  TM Distance: Normal  Tongue: Normal  Neck ROM: Normal ROM    Dental:  Intact    Chest/Lungs:  Normal Respiratory Rate    Heart:  Rhythm: Regular Rhythm        Anesthesia Plan  Type of Anesthesia, risks & benefits discussed:    Anesthesia Type: Gen Natural Airway  Intra-op Monitoring Plan: Standard ASA Monitors  Post Op Pain Control Plan: multimodal analgesia  Induction:  IV  Informed Consent: Informed consent signed with the Patient and all parties understand the risks and agree with anesthesia plan.  All questions  answered. Patient consented to blood products? No  ASA Score: 2  Day of Surgery Review of History & Physical: H&P Update referred to the surgeon/provider.    Ready For Surgery From Anesthesia Perspective.     .       leg

## 2023-02-06 ENCOUNTER — HOSPITAL ENCOUNTER (OUTPATIENT)
Facility: HOSPITAL | Age: 58
Discharge: HOME | End: 2023-02-06
Attending: INTERNAL MEDICINE | Admitting: INTERNAL MEDICINE
Payer: COMMERCIAL

## 2023-02-06 ENCOUNTER — ANESTHESIA (OUTPATIENT)
Dept: ENDOSCOPY | Facility: HOSPITAL | Age: 58
End: 2023-02-06
Payer: COMMERCIAL

## 2023-02-06 VITALS
RESPIRATION RATE: 16 BRPM | WEIGHT: 191.94 LBS | TEMPERATURE: 98 F | HEIGHT: 61 IN | OXYGEN SATURATION: 100 % | HEART RATE: 70 BPM | DIASTOLIC BLOOD PRESSURE: 67 MMHG | SYSTOLIC BLOOD PRESSURE: 111 MMHG | BODY MASS INDEX: 36.24 KG/M2

## 2023-02-06 DIAGNOSIS — Z12.11 COLON CANCER SCREENING: ICD-10-CM

## 2023-02-06 LAB — POCT GLUCOSE: 83 MG/DL (ref 70–110)

## 2023-02-06 PROCEDURE — 88305 TISSUE EXAM BY PATHOLOGIST: ICD-10-PCS | Mod: 26,,, | Performed by: PATHOLOGY

## 2023-02-06 PROCEDURE — D9220A PRA ANESTHESIA: ICD-10-PCS | Mod: 33,CRNA,, | Performed by: NURSE ANESTHETIST, CERTIFIED REGISTERED

## 2023-02-06 PROCEDURE — 27201089 HC SNARE, DISP (ANY): Performed by: INTERNAL MEDICINE

## 2023-02-06 PROCEDURE — 88305 TISSUE EXAM BY PATHOLOGIST: CPT | Mod: 26,,, | Performed by: PATHOLOGY

## 2023-02-06 PROCEDURE — 45385 COLONOSCOPY W/LESION REMOVAL: CPT | Mod: PT | Performed by: INTERNAL MEDICINE

## 2023-02-06 PROCEDURE — 88305 TISSUE EXAM BY PATHOLOGIST: CPT | Performed by: PATHOLOGY

## 2023-02-06 PROCEDURE — 63600175 PHARM REV CODE 636 W HCPCS: Performed by: INTERNAL MEDICINE

## 2023-02-06 PROCEDURE — D9220A PRA ANESTHESIA: ICD-10-PCS | Mod: 33,ANES,, | Performed by: ANESTHESIOLOGY

## 2023-02-06 PROCEDURE — 45385 COLONOSCOPY W/LESION REMOVAL: CPT | Mod: 33,,, | Performed by: INTERNAL MEDICINE

## 2023-02-06 PROCEDURE — 45385 PR COLONOSCOPY,REMV LESN,SNARE: ICD-10-PCS | Mod: 33,,, | Performed by: INTERNAL MEDICINE

## 2023-02-06 PROCEDURE — 37000008 HC ANESTHESIA 1ST 15 MINUTES: Performed by: INTERNAL MEDICINE

## 2023-02-06 PROCEDURE — D9220A PRA ANESTHESIA: Mod: 33,ANES,, | Performed by: ANESTHESIOLOGY

## 2023-02-06 PROCEDURE — D9220A PRA ANESTHESIA: Mod: 33,CRNA,, | Performed by: NURSE ANESTHETIST, CERTIFIED REGISTERED

## 2023-02-06 PROCEDURE — 63600175 PHARM REV CODE 636 W HCPCS: Performed by: NURSE ANESTHETIST, CERTIFIED REGISTERED

## 2023-02-06 RX ORDER — SODIUM CHLORIDE, SODIUM LACTATE, POTASSIUM CHLORIDE, CALCIUM CHLORIDE 600; 310; 30; 20 MG/100ML; MG/100ML; MG/100ML; MG/100ML
INJECTION, SOLUTION INTRAVENOUS CONTINUOUS
Status: ACTIVE | OUTPATIENT
Start: 2023-02-06

## 2023-02-06 RX ORDER — LIDOCAINE HCL/PF 100 MG/5ML
SYRINGE (ML) INTRAVENOUS
Status: DISCONTINUED | OUTPATIENT
Start: 2023-02-06 | End: 2023-02-06

## 2023-02-06 RX ORDER — PROPOFOL 10 MG/ML
VIAL (ML) INTRAVENOUS
Status: DISCONTINUED | OUTPATIENT
Start: 2023-02-06 | End: 2023-02-06

## 2023-02-06 RX ADMIN — PROPOFOL 50 MG: 10 INJECTION, EMULSION INTRAVENOUS at 02:02

## 2023-02-06 RX ADMIN — Medication 20 MG: at 02:02

## 2023-02-06 RX ADMIN — SODIUM CHLORIDE, POTASSIUM CHLORIDE, SODIUM LACTATE AND CALCIUM CHLORIDE: 600; 310; 30; 20 INJECTION, SOLUTION INTRAVENOUS at 12:02

## 2023-02-06 NOTE — H&P
Short Stay Endoscopy History and Physical    PCP - Xiang Asher MD  Referring Physician - Xiang Asher MD  [id number] [place] [id number]    Procedure - Colonoscopy  ASA - per anesthesia  Mallampati - per anesthesia  History of Anesthesia problems - no  Family history Anesthesia problems -  no   Plan of anesthesia - General    HPI  57 y.o. female  Reason for procedure: colon cancer screen        Personnel H/o colon polyps:no  FH of colon cancer:no  Anticoagulation:no      ROS:  Constitutional: No fevers, chills, No weight loss  CV: No chest pain  Pulm: No cough, No shortness of breath  GI: see HPI    Medical History:  has a past medical history of B12 deficiency anemia, Cervical radiculopathy, Goiter, Hyperlipidemia, and Hypertension.    Surgical History:  has a past surgical history that includes Breast biopsy (Right).    Family History: family history includes Breast cancer in her maternal cousin; Colon cancer (age of onset: 65) in her mother; Diabetes in her father; Hypertension in her son..    Social History:  reports that she has never smoked. She has never used smokeless tobacco. She reports that she does not drink alcohol and does not use drugs.    Review of patient's allergies indicates:   Allergen Reactions    Codeine        Medications:   Medications Prior to Admission   Medication Sig Dispense Refill Last Dose    metFORMIN (GLUCOPHAGE-XR) 500 MG ER 24hr tablet Take 1 tablet (500 mg total) by mouth 2 (two) times daily with meals. 180 tablet 2 2/5/2023    olmesartan-hydrochlorothiazide (BENICAR HCT) 40-25 mg per tablet Take 1 tablet by mouth once daily. 90 tablet 2 2/6/2023       Physical Exam:    Vital Signs:   Vitals:    02/06/23 1223   BP: 130/82   Pulse:    Resp:    Temp:        General Appearance: Well appearing in no acute distress  Abdomen: Soft, non tender, non distended with normal bowel sounds, no masses    Labs:  Lab Results   Component Value Date    WBC 7.63 12/12/2022     HGB 11.1 (L) 12/12/2022    HCT 37.3 12/12/2022     12/12/2022    CHOL 152 12/12/2022    TRIG 82 12/12/2022    HDL 38 (L) 12/12/2022    ALT 19 12/12/2022    AST 19 12/12/2022     12/12/2022    K 4.1 12/12/2022     12/12/2022    CREATININE 1.0 12/12/2022    BUN 16 12/12/2022    CO2 24 12/12/2022    TSH 1.341 12/12/2022    HGBA1C 5.7 (H) 12/12/2022       I have explained the risks and benefits of this endoscopic procedure to the patient including but not limited to bleeding, inflammation, infection, perforation, and death.    SEDATION PLAN: per anesthesia      History reviewed, vital signs satisfactory, cardiopulmonary status satisfactory, sedation options, risks and plans have been discussed with the patient  All their questions were answered and the patient agrees to the sedation procedures as planned and the patient is deemed an appropriate candidate for the sedation as planned.    Procedure explained to patient, informed consent obtained and placed in chart.        Saul Roberts MD

## 2023-02-06 NOTE — TRANSFER OF CARE
"Anesthesia Transfer of Care Note    Patient: Kassy Benjamin    Procedure(s) Performed: Procedure(s) (LRB):  COLONOSCOPY (N/A)    Patient location: PACU    Anesthesia Type: general    Transport from OR: Transported from OR on room air with adequate spontaneous ventilation    Post pain: adequate analgesia    Post assessment: no apparent anesthetic complications and tolerated procedure well    Post vital signs: stable    Level of consciousness: sedated    Nausea/Vomiting: no nausea/vomiting    Complications: none    Transfer of care protocol was followed      Last vitals:   Visit Vitals  /82   Pulse 74   Temp 36.4 °C (97.5 °F) (Temporal)   Resp 16   Ht 5' 1" (1.549 m)   Wt 87.1 kg (191 lb 14.6 oz)   LMP 07/25/2013   SpO2 98%   Breastfeeding No   BMI 36.26 kg/m²     "

## 2023-02-06 NOTE — LETTER
January 23, 2023    Kassy Benjamin  [address]  [place] [address]                [address]  [place] [facility] [address]  Phone: [phone]  Fax: [phone]             Kassy Benjamin  [address]  [place] [address]      [facility] ENDOSCOPY PATIENT INSTRUCTIONS    You are scheduled for a/an colonoscopy (Procedure), on MonDAY (Day), 2-6-2023 (Date).   Please check in for your appointment at 1:00 PM.                            Your procedure will be performed at [facility]. [facility] [address], [place] [address].  Many GPS systems are NOT providing accurate instructions, take [address], from either direction, to [address] and proceed to the eastbound service road, turning between the [facility] and [facility]. Once at the [facility], look for signs directing you to Hospital/Surgery. Check in for your procedure at  for Hospital/Surgery.  ([phone])    ALL PATIENTS:   ? Please plan to be at the hospital for 3 - 4 hours.    ? Use of Anesthesia requires you to have a responsible person to drive you to the hospital, stay while the procedure is being performed, assume responsibility for your care at discharge, and drive you home. You should not operate a vehicle, machinery or sign any legal documents until the next day. YOU MUST HAVE A RESPONSIBLE PERSON TO DRIVE YOU HOME.  ? Leave all valuables at home, including jewelry. (Piercings need to be removed) You will need to bring your 's license, medical insurance card, and a method of payment. You will be responsible for any co-payment at time of registration - Please reach out to Financial Services if you have any questions or concerns.    ? If biopsies need to be performed or a polyp needs to be removed, this may result in a change in the billing of your procedure and could impact your payment responsibility depending on your insurance provider.   ? Wear clothing  appropriate for easy re-dressing after sedation.   ? Please bring a complete list of all medications you are taking.     MEDICATIONS:  ? BLOOD THINNING MEDICATION (Coumadin, Plavix, Eliquis, etc.):  o If you are on a blood thinning medication, our scheduling team will obtain clearance to hold from your prescribing physician. Please do not stop these medications until it is approved by your provider. Our scheduling nurse will notify you an appropriate date and time to hold prior to your procedure.  o Coumadin, Plavix and Effient MUST be stopped 5 days prior to exam unless discussed with the doctor performing the test.    o Eliquis, Savaysa, Arixtra, and Xarelto MUST be stopped 2 days prior to exam unless discussed with the doctor performing the test.  ? WEIGHT LOSS MEDICATIONS - MUST be stopped 2 weeks prior to your appointment  ? BLOOD PRESSURE, HEART, SEIZURE, LUNG or PSYCHIATRIC MEDICATIONS you normally take in the morning, please take them the morning of your procedure.  This includes Inhalers.   o Please take these medications one hour prior to your arrival time with a small sip of water    DIABETIC PATIENTS:   ? Check blood sugar levels while following clear liquid diet on the day before the exam. Check again on the morning of the exam and as needed if you feel hypoglycemic (low blood sugar).     ? Do not take any diabetic medications (including insulin) the morning of the exam.    ? If your blood sugar goes below 70, you may drink 4 ounces of clear juice, soda or eat a piece of hard candy. Wait 15 minutes, then recheck your blood sugar. If it isn't going up, you may drink another 4 ounces of clear juice and contact the On-Call Nurse line at [phone].       [facility] GOLYTELY, COLYTE, NULYTELY, GENERIC PEG 3350 W/ELECTROLYTES INSTRUCTIONS  Please use this page as a checklist for your preparation.      [] ITEMS TO PURCHASE BEFORE YOUR PROCEDURE:  Please purchase the following items from your local pharmacy  "prior to your appointment.    4 Dulcolax (bisacodyl) laxative tablets- no prescription needed, over the counter.   Gas X (simethicone) 125 mg capsules - no prescription needed, over the counter.    Dramamine (Meclizine) 25 mg tablet (for nausea)- no prescription needed, over the counter.   Prep- A prescription is required and has been sent to your pharmacy    [] FIVE DAYS BEFORE YOUR PROCEDURE: Begin low fiber diet- see attached instructions.    []THE DAY BEFORE YOUR PROCEDURE :(WHEN YOU WAKE)  Begin clear liquids only - no solid foods may be eaten until after your procedure has been performed/completed.  Please drink 1 - 2 gallons of clear liquids throughout the day.   You may consume the following items:    Coffee, water, or tea. (We agree it's odd, but coffee and tea without milk or creamer is considered a clear liquid)    Clear carbonated beverages (soft drinks), ginger ale, sprite, 7up, sparkling water, etc. No "Energy" beverages.    Gelatin dessert, (JELLO) plain or fruit flavored. No red or purple coloring/No solid pieces of fruit.   Apple juice, white grape juice, or white cranberry juice. No pulp, no orange juice.    Gatorade, Powerade, lemonade, or limeade. No red or purple.    Clear, fat-free, beef or chicken broths, or bouillon.    Snowballs, popsicles, slushes. No red or purple coloring, no pulp.    Avoid any liquids not listed above.     [] 12:00 PM (the day before procedure):   Take 4 Dulcolax (bisacodyl) tablets with a glass of clear liquid   Take 1 Gas X (simethicone) 125 mg capsule with a glass of clear liquid    [] 5:00 PM (the day before procedure), for prevention of nausea and vomiting:   Take ½ of a tablet (12.5 mg) of Meclizine every 6 hours as needed for nausea and/or vomiting. DO NOT TAKE MORE THAN 50 MG IN A 24 HOUR PERIOD.    [] 6:00 PM (the day before procedure) Begin the first portion of the Golytely or Nulytely prep. (Best if refrigerated)  1. Add cool clear liquid to " mixing container up to the fill line and mix.  2. Drink HALF of the mixture in the container.  3. You may refrigerate remaining half of the prep until ready to use the next morning.  Please have this consumed within 1 hour and 30 minutes. ---VERY IMPORTANT---  This entire process is required for the success of the examination.    Clear liquids may be continued until you finish the second portion of the prep. This will help you remain hydrated.    [] 5:00 AM (THE MORNING OF YOUR PROCEDURE): Complete the second portion of your prep.  1. Drink the second HALF of the mixture that was prepared the previous evening.  Please have this consumed within 1 hour and 30 minutes. ---VERY IMPORTANT---  This entire process is required for the success of the examination.      After you complete the bowel prep and the required water/clear liquid, you may not have anything else by mouth except for your medications, with a small sip of water. This includes no gum, mints, tobacco products.    Please follow these instructions to ensure you have a very good prep.  The goal is for stool to be CLEAR OR YELLOW liquid - NOT BROWN.  Avoid having to repeat the procedure due to a poor prep!    Please call [phone] or [phone] if you continue to have brown stool or have any questions about your prep instructions. Remember, due to Anesthesia, you MUST have a responsible person to drive you home!        LOW FIBER DIET  FIVE (5) DAYS BEFORE YOUR PROCEDURE- Please start a Low Fiber Diet as below:  A good preparation (clean out) of the colon is necessary prior to having a colonoscopy in order to ensure that all areas of the colon can be seen without difficulty. It is helpful to avoid high fiber foods prior to having a colonoscopy as high fiber foods (especially seeds and nuts) are more difficult to completely clear out of the colon. We recommend avoiding high fiber foods for at least 4 days prior to your colonoscopy. If you have issues with  constipation you may want to start avoiding high fiber foods 7 days before the procedure.    Foods to Include In Your Diet:       Grain Products:    Enriched refined white bread, buns, bagels, English muffins   Plain cereals e.g., Cheerios, Cornflakes, Cream of Wheat, Rice Krispies, Special K   Tea biscuits, arrowroot cookies, soda crackers, kerry crackers, plain Evita toast and flour tortillas  White rice, refined pasta and noodles   Fruits:   peel fruits when possible  Fruit juices except prune juice   Soft fruits: apricots, banana (1/2), cantaloupe, canned fruit cocktail, grapes,   honeydew melon, peaches, watermelon, citrus fruits, plums, pineapple   Sauces: Apple or apricot        Vegetables:    Vegetable juices    Tomato sauces    Potatoes (no skin)    Well-cooked and tender vegetables including alfalfa sprouts, spinach, beets, carrots, celery, cucumber, eggplant, lettuce, mushrooms, green/red peppers, squash, zucchini, onions, brussel sprouts, asparagus     Meat and Protein Choice:  Well-cooked, tender meat, fish and eggs            Foods to avoid:   ? Whole grain breads and pastas, corn bread or muffins, products made with whole grain products, bran, seeds, or nuts   ? Strong cheeses, yogurt containing fruit skins or seeds   ? Raw vegetables and pickles  ? All types of whole kernel corn   ? All beans, peas, and legumes   ? Fruit peels and hard fruits   ? Tough meat, meat with gristle   ? Crunchy peanut butter   ? Nuts, seeds and popcorn   ? Millet, buckwheat, flax, oatmeal, manav seeds   ? Dried fruits, berries, other fruits with pulp or seeds (including blueberries, raspberries, and blackberries)   ? Food containing chocolate, coconut   ? Juices with pulp     Avoiding the recommended foods is one part of helping to ensure that you have adequate preparation and that you do not need to have a repeat colonoscopy any sooner than what would be recommended by the colon cancer screening guidelines.    Please  follow these instructions to ensure you have a very good prep.  The goal is for stool to be CLEAR OR YELLOW liquid - NOT BROWN.  Avoid having to repeat the procedure due to a poor prep!  Please call [phone] or [phone] if you continue to have brown stool or have any questions about your prep instructions.     Questions regarding scheduling: Endoscopy Scheduling [phone] (M-F, 7:30am-4:30pm)   Questions regarding prep or procedure: ([facility] Pre-Op Nurse [phone] or [phone]   Questions regarding prep or procedure: (Cristy Mart[facility]nez Pre-Op Nurse: [phone]   Questions about Insurance/ Financial obligations: Financial Services [phone]   [facility] [phone] (M-F, 6:30am-3:00pm)   [facility] [phone] (M-F, 6:30am-3:00pm)            Questions requiring immediate assistance: [facility] On-Call Nurse Line [phone] (After Hours)

## 2023-02-06 NOTE — PLAN OF CARE
Discharge instructions reviewed with pt and mother, handouts given, verbalized understanding with no further questions at this time. Dr. Roberts spoke to pt at bedside, reviewed procedure and answered questions aware they are awaiting biopsy results with MD telephone number provided per AVS sheet. VSS on RA, no pain or nausea noted, tolerating po fluids without difficulty, no other complaints noted. Fall precautions reviewed, consents in chart, PIV removed.

## 2023-02-06 NOTE — PROVATION PATIENT INSTRUCTIONS
Discharge Summary/Instructions after an Endoscopic Procedure  Patient Name: Kassy Benjamin  Patient MRN: [id number]  Patient YOB: 1965 Monday, February 6, 2023  Saul Roberts MD  Dear patient,  As a result of recent federal legislation (The Federal Cures Act), you may   receive lab or pathology results from your procedure in your [facility]   account before your physician is able to contact you. Your physician or   their representative will relay the results to you with their   recommendations at their soonest availability.  Thank you,  RESTRICTIONS:  During your procedure today, you received medications for sedation.  These   medications may affect your judgment, balance and coordination.  Therefore,   for 24 hours, you have the following restrictions:   - DO NOT drive a car, operate machinery, make legal/financial decisions,   sign important papers or drink alcohol.    ACTIVITY:  Today: no heavy lifting, straining or running due to procedural   sedation/anesthesia.  The following day: return to full activity including work.  DIET:  Eat and drink normally unless instructed otherwise.     TREATMENT FOR COMMON SIDE EFFECTS:  - Mild abdominal pain, nausea, belching, bloating or excessive gas:  rest,   eat lightly and use a heating pad.  - Sore Throat: treat with throat lozenges and/or gargle with warm salt   water.  - Because air was used during the procedure, expelling large amounts of air   from your rectum or belching is normal.  - If a bowel prep was taken, you may not have a bowel movement for 1-3 days.    This is normal.  SYMPTOMS TO WATCH FOR AND REPORT TO YOUR PHYSICIAN:  1. Abdominal pain or bloating, other than gas cramps.  2. Chest pain.  3. Back pain.  4. Signs of infection such as: chills or fever occurring within 24 hours   after the procedure.  5. Rectal bleeding, which would show as bright red, maroon, or black stools.   (A tablespoon of blood from the rectum is not serious,  especially if   hemorrhoids are present.)  6. Vomiting.  7. Weakness or dizziness.  GO DIRECTLY TO THE NEAREST EMERGENCY ROOM IF YOU HAVE ANY OF THE FOLLOWING:      Difficulty breathing              Chills and/or fever over 101 F   Persistent vomiting and/or vomiting blood   Severe abdominal pain   Severe chest pain   Black, tarry stools   Bleeding- more than one tablespoon   Any other symptom or condition that you feel may need urgent attention  Your doctor recommends these additional instructions:  If any biopsies were taken, your doctors clinic will contact you in 1 to 2   weeks with any results.  - Discharge patient to home (via wheelchair).   - Resume previous diet.   - Continue present medications.   - Await pathology results.   - Repeat colonoscopy in 5 years for surveillance.   - Return to referring physician as previously scheduled.   - Patient has a contact number available for emergencies.  The signs and   symptoms of potential delayed complications were discussed with the   patient.  Return to normal activities tomorrow.  Written discharge   instructions were provided to the patient.   - Patient has a contact number available for emergencies.  The signs and   symptoms of potential delayed complications were discussed with the   patient.  Return to normal activities tomorrow.  Written discharge   instructions were provided to the patient.  For questions, problems or results please call your physician Saul Roberts MD at Work:  [phone]  If you have any questions about the above instructions, call the GI   department at [phone] or call the endoscopy unit at [phone]   from 7am until 3 pm.  [facility], EMERGENCY ROOM PHONE NUMBER:   [phone]  IF A COMPLICATION OR EMERGENCY SITUATION ARISES AND YOU ARE UNABLE TO REACH   YOUR PHYSICIAN - GO DIRECTLY TO THE EMERGENCY ROOM.  I have read or have had read to me these discharge instructions for my    procedure and have received a written copy.  I understand these   instructions and will follow-up with my physician if I have any questions.     __________________________________       _____________________________________  Nurse Signature                                          Patient/Designated   Responsible Party Signature  MD Saul Olvera MD  2/6/2023 2:27:27 PM  This report has been verified and signed electronically.  Dear patient,  As a result of recent federal legislation (The Federal Cures Act), you may   receive lab or pathology results from your procedure in your [facility]   account before your physician is able to contact you. Your physician or   their representative will relay the results to you with their   recommendations at their soonest availability.  Thank you,  PROVATION

## 2023-02-06 NOTE — DISCHARGE SUMMARY
[facility] 1st Fl  Discharge Note  Short Stay    Procedure(s) (LRB):  COLONOSCOPY (N/A)      OUTCOME: Patient tolerated treatment/procedure well without complication and is now ready for discharge.    DISPOSITION: Home or Self Care    FINAL DIAGNOSIS:  Colon cancer screening    FOLLOWUP: With primary care provider    DISCHARGE INSTRUCTIONS:  No discharge procedures on file.     TIME SPENT ON DISCHARGE: 20   minutes

## 2023-02-06 NOTE — ANESTHESIA POSTPROCEDURE EVALUATION
Anesthesia Post Evaluation    Patient: Kassy Benjamin    Procedure(s) Performed: Procedure(s) (LRB):  COLONOSCOPY (N/A)    Final Anesthesia Type: general      Patient location during evaluation: PACU  Patient participation: Yes- Able to Participate  Level of consciousness: awake and alert and oriented  Post-procedure vital signs: reviewed and stable  Pain management: adequate  Airway patency: patent    PONV status at discharge: No PONV  Anesthetic complications: no      Cardiovascular status: blood pressure returned to baseline, stable and hemodynamically stable  Respiratory status: unassisted  Hydration status: euvolemic  Follow-up not needed.          Vitals Value Taken Time   BP 90/59 02/06/23 1433   Temp 36.4 °C (97.6 °F) 02/06/23 1428   Pulse 81 02/06/23 1433   Resp 15 02/06/23 1433   SpO2 97 % 02/06/23 1433         No case tracking events are documented in the log.      Pain/Ileana Score: Ileana Score: 8 (2/6/2023  2:28 PM)

## 2023-02-10 LAB
FINAL PATHOLOGIC DIAGNOSIS: NORMAL
Lab: NORMAL

## 2023-05-24 DIAGNOSIS — R73.03 PREDIABETES: ICD-10-CM

## 2023-05-24 DIAGNOSIS — I10 ESSENTIAL HYPERTENSION: ICD-10-CM

## 2023-05-24 NOTE — TELEPHONE ENCOUNTER
Refill Routing Note   Medication(s) are not appropriate for processing by [facility] for the following reason(s):      Due for refill >6 months ago:  - benicar/hctz: last dispensed 8/7/22 for 90 day supply per Epic data  - metformin: no dispenses within past 6 months per Epic data    OR action(s):  Defer Care Due:  None identified   Medication Therapy Plan: A1C due 6/11/23      Appointments  past 12m or future 3m with PCP    Date Provider   Last Visit   12/12/2022 Xiang Asher MD   Next Visit   Visit date not found Xiang Asher MD   ED visits in past 90 days: 0        Note composed:6:42 PM 05/24/2023

## 2023-05-24 NOTE — TELEPHONE ENCOUNTER
Care Due:                  Date            Visit Type   Department     Provider  --------------------------------------------------------------------------------                                EP -                              PRIMARY      [facility] INTERNAL  Last Visit: 12-      CARE ([facility])   MEDICINE       Xiang Velasquez  Next Visit: None Scheduled  None         None Found                                                            Last  Test          Frequency    Reason                     Performed    Due Date  --------------------------------------------------------------------------------    HBA1C.......  6 months...  metFORMIN................  12- 06-    [facility] Embedded Care Due Messages. Reference number: [id number].   5/24/2023 2:33:16 PM CDT

## 2023-05-25 RX ORDER — METFORMIN HYDROCHLORIDE 500 MG/1
TABLET, EXTENDED RELEASE ORAL
Qty: 180 TABLET | Refills: 0 | Status: SHIPPED | OUTPATIENT
Start: 2023-05-25 | End: 2023-12-05 | Stop reason: SDUPTHER

## 2023-05-25 RX ORDER — OLMESARTAN MEDOXOMIL AND HYDROCHLOROTHIAZIDE 40/25 40; 25 MG/1; MG/1
1 TABLET ORAL DAILY
Qty: 90 TABLET | Refills: 1 | Status: SHIPPED | OUTPATIENT
Start: 2023-05-25 | End: 2023-12-04 | Stop reason: SDUPTHER

## 2023-12-04 ENCOUNTER — LAB VISIT (OUTPATIENT)
Dept: LAB | Facility: HOSPITAL | Age: 58
End: 2023-12-04
Attending: FAMILY MEDICINE
Payer: COMMERCIAL

## 2023-12-04 ENCOUNTER — OFFICE VISIT (OUTPATIENT)
Dept: INTERNAL MEDICINE | Facility: CLINIC | Age: 58
End: 2023-12-04
Payer: COMMERCIAL

## 2023-12-04 VITALS
BODY MASS INDEX: 38.42 KG/M2 | DIASTOLIC BLOOD PRESSURE: 68 MMHG | HEIGHT: 61 IN | SYSTOLIC BLOOD PRESSURE: 110 MMHG | WEIGHT: 203.5 LBS | TEMPERATURE: 97 F | HEART RATE: 90 BPM

## 2023-12-04 DIAGNOSIS — Z12.31 ENCOUNTER FOR SCREENING MAMMOGRAM FOR MALIGNANT NEOPLASM OF BREAST: ICD-10-CM

## 2023-12-04 DIAGNOSIS — Z00.00 ROUTINE GENERAL MEDICAL EXAMINATION AT A HEALTH CARE FACILITY: Primary | ICD-10-CM

## 2023-12-04 DIAGNOSIS — Z00.00 ROUTINE GENERAL MEDICAL EXAMINATION AT A HEALTH CARE FACILITY: ICD-10-CM

## 2023-12-04 DIAGNOSIS — I10 ESSENTIAL HYPERTENSION: ICD-10-CM

## 2023-12-04 PROCEDURE — 3074F SYST BP LT 130 MM HG: CPT | Mod: CPTII,S$GLB,, | Performed by: FAMILY MEDICINE

## 2023-12-04 PROCEDURE — 84443 ASSAY THYROID STIM HORMONE: CPT | Performed by: FAMILY MEDICINE

## 2023-12-04 PROCEDURE — 1160F PR REVIEW ALL MEDS BY PRESCRIBER/CLIN PHARMACIST DOCUMENTED: ICD-10-PCS | Mod: CPTII,S$GLB,, | Performed by: FAMILY MEDICINE

## 2023-12-04 PROCEDURE — 3078F DIAST BP <80 MM HG: CPT | Mod: CPTII,S$GLB,, | Performed by: FAMILY MEDICINE

## 2023-12-04 PROCEDURE — 99999 PR PBB SHADOW E&M-EST. PATIENT-LVL IV: CPT | Mod: PBBFAC,,, | Performed by: FAMILY MEDICINE

## 2023-12-04 PROCEDURE — 83036 HEMOGLOBIN GLYCOSYLATED A1C: CPT | Performed by: FAMILY MEDICINE

## 2023-12-04 PROCEDURE — 3074F PR MOST RECENT SYSTOLIC BLOOD PRESSURE < 130 MM HG: ICD-10-PCS | Mod: CPTII,S$GLB,, | Performed by: FAMILY MEDICINE

## 2023-12-04 PROCEDURE — 3008F PR BODY MASS INDEX (BMI) DOCUMENTED: ICD-10-PCS | Mod: CPTII,S$GLB,, | Performed by: FAMILY MEDICINE

## 2023-12-04 PROCEDURE — 85025 COMPLETE CBC W/AUTO DIFF WBC: CPT | Performed by: FAMILY MEDICINE

## 2023-12-04 PROCEDURE — 80053 COMPREHEN METABOLIC PANEL: CPT | Performed by: FAMILY MEDICINE

## 2023-12-04 PROCEDURE — 36415 COLL VENOUS BLD VENIPUNCTURE: CPT | Mod: PO | Performed by: FAMILY MEDICINE

## 2023-12-04 PROCEDURE — 80061 LIPID PANEL: CPT | Performed by: FAMILY MEDICINE

## 2023-12-04 PROCEDURE — 99999 PR PBB SHADOW E&M-EST. PATIENT-LVL IV: ICD-10-PCS | Mod: PBBFAC,,, | Performed by: FAMILY MEDICINE

## 2023-12-04 PROCEDURE — 99396 PREV VISIT EST AGE 40-64: CPT | Mod: S$GLB,,, | Performed by: FAMILY MEDICINE

## 2023-12-04 PROCEDURE — 99396 PR PREVENTIVE VISIT,EST,40-64: ICD-10-PCS | Mod: S$GLB,,, | Performed by: FAMILY MEDICINE

## 2023-12-04 PROCEDURE — 3008F BODY MASS INDEX DOCD: CPT | Mod: CPTII,S$GLB,, | Performed by: FAMILY MEDICINE

## 2023-12-04 PROCEDURE — 1160F RVW MEDS BY RX/DR IN RCRD: CPT | Mod: CPTII,S$GLB,, | Performed by: FAMILY MEDICINE

## 2023-12-04 PROCEDURE — 3078F PR MOST RECENT DIASTOLIC BLOOD PRESSURE < 80 MM HG: ICD-10-PCS | Mod: CPTII,S$GLB,, | Performed by: FAMILY MEDICINE

## 2023-12-04 PROCEDURE — 1159F PR MEDICATION LIST DOCUMENTED IN MEDICAL RECORD: ICD-10-PCS | Mod: CPTII,S$GLB,, | Performed by: FAMILY MEDICINE

## 2023-12-04 PROCEDURE — 1159F MED LIST DOCD IN RCRD: CPT | Mod: CPTII,S$GLB,, | Performed by: FAMILY MEDICINE

## 2023-12-04 RX ORDER — OLMESARTAN MEDOXOMIL AND HYDROCHLOROTHIAZIDE 40/25 40; 25 MG/1; MG/1
1 TABLET ORAL DAILY
Qty: 90 TABLET | Refills: 3 | Status: SHIPPED | OUTPATIENT
Start: 2023-12-04

## 2023-12-04 RX ORDER — TRIAMCINOLONE ACETONIDE 1 MG/G
CREAM TOPICAL 2 TIMES DAILY
Qty: 80 G | Refills: 0 | Status: SHIPPED | OUTPATIENT
Start: 2023-12-04

## 2023-12-04 NOTE — PROGRESS NOTES
"Subjective:      Patient ID: Kassy Benjamin is a 58 y.o. female.    Chief Complaint: Annual Exam    HPI  57 yo here for annual visit.  Taking meds, but not metformin.  Wasn't able to get at pharmacy//recalled  Hair loss//feels it may be related to the metformin  Rash on neck/clavicle area.  Just a patch/itchy.  Thinks it was something she wore.    Past Medical History:   Diagnosis Date    B12 deficiency anemia     Cervical radiculopathy     Goiter     Hyperlipidemia     Hypertension      Family History   Problem Relation Age of Onset    Hypertension Son     Colon cancer Mother 65    Diabetes Father     Breast cancer Maternal Cousin      Past Surgical History:   Procedure Laterality Date    BREAST BIOPSY Right     3/2014 at [facility]//neg    COLONOSCOPY N/A 2/6/2023    Procedure: COLONOSCOPY;  Surgeon: Saul Roberts MD;  Location: [facility];  Service: Endoscopy;  Laterality: N/A;     Social History     Tobacco Use    Smoking status: Never    Smokeless tobacco: Never   Substance Use Topics    Alcohol use: No    Drug use: No       /68 (BP Location: Left arm)   Pulse 90   Temp 97.4 °F (36.3 °C)   Ht 5' 1" (1.549 m)   Wt 92.3 kg (203 lb 7.8 oz)   LMP 07/25/2013   BMI 38.45 kg/m²     Review of Systems   Constitutional:  Negative for activity change, appetite change, chills, diaphoresis, fatigue, fever and unexpected weight change.   HENT:  Negative for ear pain, hearing loss, postnasal drip, rhinorrhea and tinnitus.    Eyes:  Negative for visual disturbance.   Respiratory:  Negative for cough, shortness of breath and wheezing.    Cardiovascular:  Negative for chest pain, palpitations and leg swelling.   Gastrointestinal:  Negative for abdominal distention.   Genitourinary:  Negative for dysuria, frequency, hematuria and urgency.   Musculoskeletal:  Negative for back pain and joint swelling.   Skin:  Positive for rash.   Neurological:  Negative for weakness and headaches.   Hematological:  " Negative for adenopathy.   Psychiatric/Behavioral:  Negative for confusion and decreased concentration.        Objective:     Physical Exam  Vitals and nursing note reviewed.   Constitutional:       General: She is not in acute distress.     Appearance: She is well-developed.   HENT:      Right Ear: External ear normal.      Left Ear: External ear normal.      Nose: Nose normal.   Eyes:      Conjunctiva/sclera: Conjunctivae normal.      Pupils: Pupils are equal, round, and reactive to light.   Neck:      Thyroid: No thyromegaly.   Cardiovascular:      Rate and Rhythm: Normal rate and regular rhythm.      Heart sounds: Normal heart sounds.   Pulmonary:      Effort: Pulmonary effort is normal. No respiratory distress.      Breath sounds: Normal breath sounds. No wheezing or rales.   Abdominal:      General: Bowel sounds are normal. There is no distension.      Palpations: Abdomen is soft.      Tenderness: There is no abdominal tenderness. There is no guarding.   Musculoskeletal:         General: Normal range of motion.      Cervical back: Normal range of motion and neck supple.   Skin:     General: Skin is warm and dry.      Findings: No rash.   Neurological:      Mental Status: She is alert and oriented to person, place, and time.      Cranial Nerves: No cranial nerve deficit.   Psychiatric:         Behavior: Behavior normal.         Thought Content: Thought content normal.         Judgment: Judgment normal.         Lab Results   Component Value Date    WBC 7.63 12/12/2022    HGB 11.1 (L) 12/12/2022    HCT 37.3 12/12/2022     12/12/2022    CHOL 152 12/12/2022    TRIG 82 12/12/2022    HDL 38 (L) 12/12/2022    ALT 19 12/12/2022    AST 19 12/12/2022     12/12/2022    K 4.1 12/12/2022     12/12/2022    CREATININE 1.0 12/12/2022    BUN 16 12/12/2022    CO2 24 12/12/2022    TSH 1.341 12/12/2022    HGBA1C 5.7 (H) 12/12/2022       Assessment:     1. Routine general medical examination at a [facility]  facility    2. Essential hypertension    3. Encounter for screening mammogram for malignant neoplasm of breast         Plan:     Routine general medical examination at a health care facility  -     CBC Auto Differential; Future; Expected date: 12/04/2023  -     Comprehensive Metabolic Panel; Future; Expected date: 12/04/2023  -     Hemoglobin A1C; Future; Expected date: 12/04/2023  -     Lipid Panel; Future; Expected date: 12/04/2023  -     TSH; Future; Expected date: 12/04/2023    Essential hypertension  -     olmesartan-hydrochlorothiazide (BENICAR HCT) 40-25 mg per tablet; Take 1 tablet by mouth once daily.  Dispense: 90 tablet; Refill: 3    Encounter for screening mammogram for malignant neoplasm of breast  -     Mammo Digital Screening Bilat w/ Nikolai; Future; Expected date: 12/04/2023    Other orders  -     triamcinolone acetonide 0.1% (KENALOG) 0.1 % cream; Apply topically 2 (two) times daily.  Dispense: 80 g; Refill: 0      Update labs  Will see if possibly could start GLP1 instead of metformin.  Possibly trulicity.  Will see how labs look  BP stable/cont med  Update mammo  Cream for skin  Shingrix encouraged//declined today  Fu annually and PRN

## 2023-12-05 ENCOUNTER — PATIENT MESSAGE (OUTPATIENT)
Dept: INTERNAL MEDICINE | Facility: CLINIC | Age: 58
End: 2023-12-05
Payer: COMMERCIAL

## 2023-12-05 ENCOUNTER — TELEPHONE (OUTPATIENT)
Dept: INTERNAL MEDICINE | Facility: CLINIC | Age: 58
End: 2023-12-05
Payer: COMMERCIAL

## 2023-12-05 DIAGNOSIS — R73.03 PREDIABETES: ICD-10-CM

## 2023-12-05 LAB
ALBUMIN SERPL BCP-MCNC: 3.8 G/DL (ref 3.5–5.2)
ALP SERPL-CCNC: 103 U/L (ref 55–135)
ALT SERPL W/O P-5'-P-CCNC: 24 U/L (ref 10–44)
ANION GAP SERPL CALC-SCNC: 8 MMOL/L (ref 8–16)
AST SERPL-CCNC: 25 U/L (ref 10–40)
BASOPHILS # BLD AUTO: 0.06 K/UL (ref 0–0.2)
BASOPHILS NFR BLD: 0.9 % (ref 0–1.9)
BILIRUB SERPL-MCNC: 0.4 MG/DL (ref 0.1–1)
BUN SERPL-MCNC: 18 MG/DL (ref 6–20)
CALCIUM SERPL-MCNC: 9.8 MG/DL (ref 8.7–10.5)
CHLORIDE SERPL-SCNC: 108 MMOL/L (ref 95–110)
CHOLEST SERPL-MCNC: 159 MG/DL (ref 120–199)
CHOLEST/HDLC SERPL: 3.9 {RATIO} (ref 2–5)
CO2 SERPL-SCNC: 27 MMOL/L (ref 23–29)
CREAT SERPL-MCNC: 1.2 MG/DL (ref 0.5–1.4)
DIFFERENTIAL METHOD: ABNORMAL
EOSINOPHIL # BLD AUTO: 0.2 K/UL (ref 0–0.5)
EOSINOPHIL NFR BLD: 2.6 % (ref 0–8)
ERYTHROCYTE [DISTWIDTH] IN BLOOD BY AUTOMATED COUNT: 13 % (ref 11.5–14.5)
EST. GFR  (NO RACE VARIABLE): 52.1 ML/MIN/1.73 M^2
ESTIMATED AVG GLUCOSE: 131 MG/DL (ref 68–131)
GLUCOSE SERPL-MCNC: 75 MG/DL (ref 70–110)
HBA1C MFR BLD: 6.2 % (ref 4–5.6)
HCT VFR BLD AUTO: 36.1 % (ref 37–48.5)
HDLC SERPL-MCNC: 41 MG/DL (ref 40–75)
HDLC SERPL: 25.8 % (ref 20–50)
HGB BLD-MCNC: 11.4 G/DL (ref 12–16)
IMM GRANULOCYTES # BLD AUTO: 0.01 K/UL (ref 0–0.04)
IMM GRANULOCYTES NFR BLD AUTO: 0.1 % (ref 0–0.5)
LDLC SERPL CALC-MCNC: 102.4 MG/DL (ref 63–159)
LYMPHOCYTES # BLD AUTO: 2.4 K/UL (ref 1–4.8)
LYMPHOCYTES NFR BLD: 34 % (ref 18–48)
MCH RBC QN AUTO: 30.2 PG (ref 27–31)
MCHC RBC AUTO-ENTMCNC: 31.6 G/DL (ref 32–36)
MCV RBC AUTO: 96 FL (ref 82–98)
MONOCYTES # BLD AUTO: 0.4 K/UL (ref 0.3–1)
MONOCYTES NFR BLD: 6.3 % (ref 4–15)
NEUTROPHILS # BLD AUTO: 3.9 K/UL (ref 1.8–7.7)
NEUTROPHILS NFR BLD: 56.1 % (ref 38–73)
NONHDLC SERPL-MCNC: 118 MG/DL
NRBC BLD-RTO: 0 /100 WBC
PLATELET # BLD AUTO: 221 K/UL (ref 150–450)
PMV BLD AUTO: 10.3 FL (ref 9.2–12.9)
POTASSIUM SERPL-SCNC: 4.4 MMOL/L (ref 3.5–5.1)
PROT SERPL-MCNC: 7.7 G/DL (ref 6–8.4)
RBC # BLD AUTO: 3.78 M/UL (ref 4–5.4)
SODIUM SERPL-SCNC: 143 MMOL/L (ref 136–145)
TRIGL SERPL-MCNC: 78 MG/DL (ref 30–150)
TSH SERPL DL<=0.005 MIU/L-ACNC: 1.57 UIU/ML (ref 0.4–4)
WBC # BLD AUTO: 6.94 K/UL (ref 3.9–12.7)

## 2023-12-05 RX ORDER — DULAGLUTIDE 0.75 MG/.5ML
0.75 INJECTION, SOLUTION SUBCUTANEOUS
Qty: 4 PEN | Refills: 0 | Status: SHIPPED | OUTPATIENT
Start: 2023-12-05 | End: 2024-12-04

## 2023-12-05 RX ORDER — METFORMIN HYDROCHLORIDE 500 MG/1
500 TABLET, EXTENDED RELEASE ORAL 2 TIMES DAILY WITH MEALS
Qty: 180 TABLET | Refills: 0 | Status: SHIPPED | OUTPATIENT
Start: 2023-12-05

## 2023-12-05 NOTE — TELEPHONE ENCOUNTER
----- Message from Earnestine Huang MA sent at 12/5/2023  9:38 AM CST -----  The patient calling to speak with staff about her prescriptions that they discussed. Please give her a call back at [phone].

## 2023-12-05 NOTE — TELEPHONE ENCOUNTER
Pt saw her results on the portal and wants to know how much iron do you recommend she takes? She is taking a multivitamin now that has iron in it, but isn't sure if it enough.     Pt said, the pharmacist told her she will need to have a prescription sent to them in order for them to know if her insurance will cover Trulicity. Advised pt to contact her insurance company to find out if Trulicity is covered. She verbalized understanding.

## 2023-12-15 ENCOUNTER — PATIENT OUTREACH (OUTPATIENT)
Dept: ADMINISTRATIVE | Facility: HOSPITAL | Age: 58
End: 2023-12-15
Payer: COMMERCIAL

## 2023-12-15 NOTE — PROGRESS NOTES
Mammogram report: Per chart review, patient has an appointment scheduled on 1/22/24 for annual mammogram.

## 2024-01-22 ENCOUNTER — HOSPITAL ENCOUNTER (OUTPATIENT)
Dept: RADIOLOGY | Facility: HOSPITAL | Age: 59
Discharge: HOME | End: 2024-01-22
Attending: FAMILY MEDICINE
Payer: COMMERCIAL

## 2024-01-22 VITALS — HEIGHT: 61 IN | WEIGHT: 203.5 LBS | BODY MASS INDEX: 38.42 KG/M2

## 2024-01-22 DIAGNOSIS — Z12.31 ENCOUNTER FOR SCREENING MAMMOGRAM FOR MALIGNANT NEOPLASM OF BREAST: ICD-10-CM

## 2024-01-22 PROCEDURE — 77067 SCR MAMMO BI INCL CAD: CPT | Mod: 26,,, | Performed by: RADIOLOGY

## 2024-01-22 PROCEDURE — 77063 BREAST TOMOSYNTHESIS BI: CPT | Mod: 26,,, | Performed by: RADIOLOGY

## 2024-01-22 PROCEDURE — 77067 SCR MAMMO BI INCL CAD: CPT | Mod: TC

## 2025-02-06 ENCOUNTER — OFFICE VISIT (OUTPATIENT)
Dept: INTERNAL MEDICINE | Facility: CLINIC | Age: 60
End: 2025-02-06
Payer: COMMERCIAL

## 2025-02-06 ENCOUNTER — HOSPITAL ENCOUNTER (OUTPATIENT)
Dept: RADIOLOGY | Facility: HOSPITAL | Age: 60
Discharge: HOME | End: 2025-02-06
Attending: INTERNAL MEDICINE
Payer: COMMERCIAL

## 2025-02-06 VITALS
SYSTOLIC BLOOD PRESSURE: 122 MMHG | DIASTOLIC BLOOD PRESSURE: 68 MMHG | BODY MASS INDEX: 39 KG/M2 | TEMPERATURE: 97 F | WEIGHT: 206.56 LBS | OXYGEN SATURATION: 98 % | HEIGHT: 61 IN | HEART RATE: 102 BPM

## 2025-02-06 DIAGNOSIS — M25.50 ARTHRALGIA, UNSPECIFIED JOINT: ICD-10-CM

## 2025-02-06 DIAGNOSIS — I10 ESSENTIAL HYPERTENSION: ICD-10-CM

## 2025-02-06 DIAGNOSIS — G89.29 CHRONIC BILATERAL LOW BACK PAIN, UNSPECIFIED WHETHER SCIATICA PRESENT: ICD-10-CM

## 2025-02-06 DIAGNOSIS — R73.03 PREDIABETES: ICD-10-CM

## 2025-02-06 DIAGNOSIS — M54.32 SCIATICA, LEFT SIDE: ICD-10-CM

## 2025-02-06 DIAGNOSIS — I10 PRIMARY HYPERTENSION: Chronic | ICD-10-CM

## 2025-02-06 DIAGNOSIS — E66.01 SEVERE OBESITY WITH BODY MASS INDEX (BMI) OF 35.0 TO 39.9 WITH SERIOUS COMORBIDITY: ICD-10-CM

## 2025-02-06 DIAGNOSIS — M25.473 ANKLE EDEMA: ICD-10-CM

## 2025-02-06 DIAGNOSIS — M54.50 CHRONIC BILATERAL LOW BACK PAIN, UNSPECIFIED WHETHER SCIATICA PRESENT: ICD-10-CM

## 2025-02-06 DIAGNOSIS — Z12.31 ENCOUNTER FOR SCREENING MAMMOGRAM FOR BREAST CANCER: ICD-10-CM

## 2025-02-06 DIAGNOSIS — M25.579 ANKLE PAIN, UNSPECIFIED CHRONICITY, UNSPECIFIED LATERALITY: ICD-10-CM

## 2025-02-06 DIAGNOSIS — Z00.00 ROUTINE GENERAL MEDICAL EXAMINATION AT A HEALTH CARE FACILITY: Primary | ICD-10-CM

## 2025-02-06 PROCEDURE — 99999 PR PBB SHADOW E&M-EST. PATIENT-LVL V: CPT | Mod: PBBFAC,,, | Performed by: INTERNAL MEDICINE

## 2025-02-06 PROCEDURE — 72100 X-RAY EXAM L-S SPINE 2/3 VWS: CPT | Mod: TC

## 2025-02-06 PROCEDURE — 99396 PREV VISIT EST AGE 40-64: CPT | Mod: S$GLB,,, | Performed by: INTERNAL MEDICINE

## 2025-02-06 PROCEDURE — 99214 OFFICE O/P EST MOD 30 MIN: CPT | Mod: 25,S$GLB,, | Performed by: INTERNAL MEDICINE

## 2025-02-06 PROCEDURE — 72100 X-RAY EXAM L-S SPINE 2/3 VWS: CPT | Mod: 26,,, | Performed by: RADIOLOGY

## 2025-02-06 RX ORDER — MELOXICAM 7.5 MG/1
7.5 TABLET ORAL DAILY
COMMUNITY
Start: 2025-02-04

## 2025-02-06 RX ORDER — GABAPENTIN 100 MG/1
CAPSULE ORAL
Qty: 90 CAPSULE | Refills: 0 | Status: SHIPPED | OUTPATIENT
Start: 2025-02-06

## 2025-02-06 RX ORDER — METHYLPREDNISOLONE 4 MG/1
TABLET ORAL
Qty: 1 EACH | Refills: 0 | Status: SHIPPED | OUTPATIENT
Start: 2025-02-06

## 2025-02-06 RX ORDER — METFORMIN HYDROCHLORIDE 500 MG/1
500 TABLET, EXTENDED RELEASE ORAL 2 TIMES DAILY WITH MEALS
Qty: 180 TABLET | Refills: 3 | Status: SHIPPED | OUTPATIENT
Start: 2025-02-06

## 2025-02-06 RX ORDER — OLMESARTAN MEDOXOMIL AND HYDROCHLOROTHIAZIDE 40/25 40; 25 MG/1; MG/1
1 TABLET ORAL DAILY
Qty: 90 TABLET | Refills: 3 | Status: SHIPPED | OUTPATIENT
Start: 2025-02-06

## 2025-02-06 RX ORDER — CYCLOBENZAPRINE HCL 5 MG
5 TABLET ORAL NIGHTLY PRN
COMMUNITY
Start: 2025-02-04

## 2025-02-06 NOTE — PROGRESS NOTES
"Subjective:      Patient ID: Kassy Benjamin is a 59 y.o. female.    Chief Complaint: Establish Care (Went to urgent care two days ago, they stated it could be related to her sciatic nerve. /Pt having difficulty getting up from a seat)    HPI      59 y.o. with  Problem List[1]  Past Medical History:   Diagnosis Date    B12 deficiency anemia     Cervical radiculopathy     Goiter     Hyperlipidemia     Hypertension        Here today for annual prev exam.  Compliant with meds without significant side effects. Energy and appetite are good.     She also has mult c/o.     Has had chronic low back pain for 15 years.   Sleeping in chair since thanksgiving. Uses ottoman. Feels it helps her back.  UC Tuesday ... Rx meloxicam and flexeril. No recent trauma.    Also reports one week of swollen tender left ankle. No warmth. No trauma.      Past Surgical History:   Procedure Laterality Date    BREAST BIOPSY Right     3/2014 at [facility]//neg    COLONOSCOPY N/A 2/6/2023    Procedure: COLONOSCOPY;  Surgeon: Saul Roberts MD;  Location: [facility];  Service: Endoscopy;  Laterality: N/A;     Social History[2]  family history includes Breast cancer in her maternal cousin; Colon cancer (age of onset: 65) in her mother; Diabetes in her father; Hypertension in her son.      Review of Systems  Objective:   /68 (BP Location: Right arm, Patient Position: Sitting)   Pulse 102   Temp 97.3 °F (36.3 °C) (Tympanic)   Ht 5' 1" (1.549 m)   Wt 93.7 kg (206 lb 9.1 oz)   LMP 07/25/2013   SpO2 98%   BMI 39.03 kg/m²     Physical Exam  Constitutional:       General: She is not in acute distress.     Appearance: She is well-developed.   HENT:      Head: Normocephalic and atraumatic.   Eyes:      Extraocular Movements: Extraocular movements intact.   Neck:      Thyroid: No thyromegaly.   Cardiovascular:      Rate and Rhythm: Normal rate and regular rhythm.   Pulmonary:      Breath sounds: Normal breath sounds. No wheezing or " rales.   Abdominal:      General: Bowel sounds are normal.      Palpations: Abdomen is soft.      Tenderness: There is no abdominal tenderness.   Musculoskeletal:         General: No swelling.      Cervical back: Neck supple. No rigidity.      Lumbar back: No tenderness or bony tenderness. Decreased range of motion. Positive right straight leg raise test. Negative left straight leg raise test.      Left ankle: Swelling present. No ecchymosis or lacerations. Tenderness present. Normal pulse.   Lymphadenopathy:      Cervical: No cervical adenopathy.   Skin:     General: Skin is warm and dry.   Neurological:      Mental Status: She is alert and oriented to person, place, and time.   Psychiatric:         Behavior: Behavior normal.         Lab Results   Component Value Date    WBC 10.92 02/06/2025    HGB 10.6 (L) 02/06/2025    HGB 11.4 (L) 12/04/2023    HGB 11.1 (L) 12/12/2022    HCT 34.4 (L) 02/06/2025    MCV 98 02/06/2025    MCV 96 12/04/2023    MCV 99 (H) 12/12/2022     02/06/2025    CHOL 162 02/06/2025    TRIG 78 02/06/2025    HDL 40 02/06/2025    LDLCALC 106.4 02/06/2025    LDLCALC 102.4 12/04/2023    LDLCALC 97.6 12/12/2022    ALT 16 02/06/2025    AST 18 02/06/2025     02/06/2025    K 4.4 02/06/2025    CALCIUM 9.6 02/06/2025     02/06/2025    CO2 24 02/06/2025    BUN 21 (H) 02/06/2025    CREATININE 1.2 02/06/2025    CREATININE 1.2 12/04/2023    CREATININE 1.0 12/12/2022    EGFRNORACEVR 51.8 (A) 02/06/2025    EGFRNORACEVR 52.1 (A) 12/04/2023    EGFRNORACEVR >60.0 12/12/2022    TSH 1.371 02/06/2025    TSH 2.281 07/15/2024    TSH 1.572 12/04/2023    GLU 88 02/06/2025    HGBA1C 5.8 (H) 02/06/2025    HGBA1C 6.3 07/15/2024    HGBA1C 6.2 (H) 12/04/2023    BNP <10 02/06/2025          The 10-year ASCVD risk score (Sarah URIAS, et al., 2019) is: 5.9%    Values used to calculate the score:      Age: 59 years      Sex: Female      Is Non- : Yes      Diabetic: No      Tobacco smoker:  No      Systolic Blood Pressure: 122 mmHg      Is BP treated: Yes      HDL Cholesterol: 40 mg/dL      Total Cholesterol: 162 mg/dL     Assessment:     1. Routine general medical examination at a health care facility    2. Primary hypertension    3. Encounter for screening mammogram for breast cancer    4. Essential hypertension    5. Prediabetes    6. Sciatica, left side    7. Ankle pain, unspecified chronicity, unspecified laterality    8. Ankle edema    9. Severe obesity with body mass index (BMI) of 35.0 to 39.9 with serious comorbidity    10. Arthralgia, unspecified joint    11. Chronic bilateral low back pain, unspecified whether sciatica present      Plan:   1. Routine general medical examination at a health care facility  Overview:  Heart healthy diet, regular exercise, and regular use of sunscreen.    reviewed      Orders:  -     Comprehensive Metabolic Panel; Future; Expected date: 02/06/2025  -     CBC Auto Differential; Future; Expected date: 02/06/2025  -     TSH; Future; Expected date: 02/06/2025  -     Lipid Panel; Future; Expected date: 02/06/2025  -     Hemoglobin A1C; Future; Expected date: 02/06/2025    2. Primary hypertension    3. Encounter for screening mammogram for breast cancer  -     Mammo Digital Screening Bilat w/ Nikolai; Future; Expected date: 02/06/2025    4. Essential hypertension  -     olmesartan-hydrochlorothiazide (BENICAR HCT) 40-25 mg per tablet; Take 1 tablet by mouth once daily.  Dispense: 90 tablet; Refill: 3    5. Prediabetes  -     metFORMIN (GLUCOPHAGE-XR) 500 MG ER 24hr tablet; Take 1 tablet (500 mg total) by mouth 2 (two) times daily with meals.  Dispense: 180 tablet; Refill: 3    6. Sciatica, left side  -     X-Ray Lumbar Spine Ap And Lateral; Future; Expected date: 02/06/2025  -     gabapentin (NEURONTIN) 100 MG capsule; 1 to 3 tabs po qhs for nerve pain.  Dispense: 90 capsule; Refill: 0  -     methylPREDNISolone (MEDROL, MARI,) 4 mg tablet; use as directed  Dispense: 1  each; Refill: 0  -     Ambulatory Referral/Consult to Physical/Occupational Therapy; Future; Expected date: 02/13/2025  -     Cancel: Ambulatory referral/consult to [facility]; Future; Expected date: 02/13/2025  -     Ambulatory referral/consult to [facility]; Future; Expected date: 02/13/2025    7. Ankle pain, unspecified chronicity, unspecified laterality  -     Uric Acid; Future; Expected date: 02/06/2025    8. Ankle edema  -     BNP; Future; Expected date: 02/06/2025    9. Severe obesity with body mass index (BMI) of 35.0 to 39.9 with serious comorbidity  -     Ambulatory referral/consult to [facility]; Future; Expected date: 02/13/2025    10. Arthralgia, unspecified joint  -     Sedimentation rate; Future; Expected date: 02/06/2025  -     C-Reactive Protein; Future; Expected date: 02/06/2025  -     HUNTER; Future; Expected date: 02/06/2025  -     RHEUMATOID FACTOR; Future; Expected date: 02/06/2025  -     CYCLIC CITRUL PEPTIDE ANTIBODY, IGG; Future; Expected date: 02/06/2025    11. Chronic bilateral low back pain, unspecified whether sciatica present  -     Ambulatory referral/consult to [facility]; Future; Expected date: 02/13/2025        Patient Instructions   Start sleeping in bed. Flat if possible.   Hold the meloxicam for now.     Future Appointments   Date Time Provider Department Center   4/28/2025  9:00 AM Nathalie Jo MD [facility] [place]   8/11/2025 10:00 AM Juvenal Butler MD [facility] [place]           Follow up in 6 months (on 8/6/2025), or if symptoms worsen or fail to improve.         Visit today included increased complexity  addressed and managing the longitudinal care of the patient due to the serious and/or complex managed problem(s)             [1]   Patient Active Problem List  Diagnosis    HTN (hypertension)    Abnormal mammogram    Pain in shin    Bone lesion    Routine general medical examination at a health care facility   [2]    Social History  Socioeconomic History    Marital status: Single    Number of children: 3   Occupational History    Occupation:    Tobacco Use    Smoking status: Never    Smokeless tobacco: Never   Substance and Sexual Activity    Alcohol use: No    Drug use: No    Sexual activity: Yes     Partners: Male

## 2025-02-10 ENCOUNTER — TELEPHONE (OUTPATIENT)
Dept: PAIN MEDICINE | Facility: CLINIC | Age: 60
End: 2025-02-10
Payer: COMMERCIAL

## 2025-02-15 ENCOUNTER — TELEPHONE (OUTPATIENT)
Dept: INTERNAL MEDICINE | Facility: CLINIC | Age: 60
End: 2025-02-15
Payer: COMMERCIAL

## 2025-02-15 PROBLEM — Z00.00 ROUTINE GENERAL MEDICAL EXAMINATION AT A HEALTH CARE FACILITY: Status: ACTIVE | Noted: 2023-02-06

## 2025-02-16 NOTE — TELEPHONE ENCOUNTER
Your labs indicate that gout could be playing a role in your symptoms. Has the medrol dose pack helped any or your pain or swelling?  Thank you,  Dr. Butler

## 2025-04-24 NOTE — PROGRESS NOTES
New Patient Interventional Pain Note (Initial Visit)    Referring Physician: Juvenal Butler MD    PCP: Juvenal Butler MD    Chief Complaint:   Chief Complaint   Patient presents with    Low-back Pain    Leg Pain        SUBJECTIVE:    Kassy Benjamin is a 59 y.o. female with past medical history significant for  who presents to the clinic for the evaluation of lower back and leg pain.  Patient reports pain has been present since 3 Jensen is ago where she remembers sitting on the couch and having sudden radiating pain down the left lower extremity.  Pain is intermittent and has insidiously worsened over the last 3 months.  Pain today is rated a 6/10.  She reports pain in the lower back which can radiate down the posterior and lateral aspect of the left lower extremity in L4-S1 distribution to the foot.  Patient does report associated bilateral foot swelling.  Pain can be exacerbated with all motion but particularly prolonged standing.  Pain is improved with topical application of icy hot cream as well as eat quite Lidoderm cream which she applies to her lower back, legs and feet.  He has taken Flexeril and gabapentin which have caused significant drowsiness but not necessarily helped with pain or functionality.  She has completed conventional land-based physical therapy in the past.  She has continued physician directed physical therapy exercises for lower back and leg pain daily from 02/28/2025 through 04/28/2025 with marginal improvement in her symptoms.  Pain significantly interferes with activities of daily living and quality of life.  Of note patient works at [employer] as spends several hours daily standing and walking which can exacerbate her symptoms.  She has not received prior interventional treatment.    Patient reports significant motor weakness and loss of sensations.  Patient denies night fever/night sweats, urinary incontinence, bowel incontinence, and significant weight loss.      Pain  Disability Index Review:         4/28/2025     8:52 AM   Last 3 PDI Scores   Pain Disability Index (PDI) 36       Non-Pharmacologic Treatments:  Physical Therapy/Home Exercise: yes  Ice/Heat:yes  TENS: no  Acupuncture: no  Massage: yes  Chiropractic: no    Other: no      Pain Medications:  - Adjuvant Medications: Cyclobenzaprine (Flexeril), Mobic (Meloxicam), Neurontin (Gabapentin), and Topical Ointment (Voltaren Gel, Steroid cream, Anti-Inflammatory Cream, Compound cream)    Pain Procedures:   None    Past Medical History:   Diagnosis Date    B12 deficiency anemia     Cervical radiculopathy     Goiter     Hyperlipidemia     Hypertension      Past Surgical History:   Procedure Laterality Date    BREAST BIOPSY Right     3/2014 at [facility]//neg    COLONOSCOPY N/A 2/6/2023    Procedure: COLONOSCOPY;  Surgeon: Saul Roberts MD;  Location: [facility];  Service: Endoscopy;  Laterality: N/A;     Review of patient's allergies indicates:   Allergen Reactions    Codeine        Current Outpatient Medications   Medication Sig    meloxicam (MOBIC) 7.5 MG tablet Take 7.5 mg by mouth once daily.    metFORMIN (GLUCOPHAGE-XR) 500 MG ER 24hr tablet Take 1 tablet (500 mg total) by mouth 2 (two) times daily with meals.    methylPREDNISolone (MEDROL, MARI,) 4 mg tablet use as directed    olmesartan-hydrochlorothiazide (BENICAR HCT) 40-25 mg per tablet Take 1 tablet by mouth once daily.    triamcinolone acetonide 0.1% (KENALOG) 0.1 % cream Apply topically 2 (two) times daily.    diazePAM (VALIUM) 5 MG tablet Take 1 tablet (5 mg total) by mouth once. 45 minutes to 1 hour prior to MRI for procedural anxiety for 1 dose    DULoxetine (CYMBALTA) 30 MG capsule Take 1 capsule (30 mg total) by mouth once daily.    gabapentin (NEURONTIN) 100 MG capsule Take 100 mg by mouth 3 (three) times daily.    methocarbamoL (ROBAXIN) 500 MG Tab Take 1 tablet (500 mg total) by mouth 2 (two) times daily as needed.     No current facility-administered  "medications for this visit.     Facility-Administered Medications Ordered in Other Visits   Medication    lactated ringers infusion         ROS:  GENERAL:  No weight loss, malaise or fevers.  HEENT:   No recent changes in vision or hearing  NECK:  Negative for lumps, no difficulty with swallowing.  RESPIRATORY:  Negative for cough, wheezing or shortness of breath, patient denies any recent URI.  CARDIOVASCULAR:  Negative for chest pain or palpitations.  GI:  Negative for abdominal discomfort, blood in stools or black stools or change in bowel habits.  MUSCULOSKELETAL:  See HPI.  SKIN:  Negative for lesions, rash, and itching.  PSYCH:  No mood disorder or recent psychosocial stressors.   HEMATOLOGY/LYMPHOLOGY:  Negative for prolonged bleeding, bruising easily or swollen nodes.    NEURO:   No history of syncope, paralysis, seizures or tremors.  All other reviewed and negative other than HPI.    OBJECTIVE:    BP (!) 149/83   Pulse 64   Resp 17   Ht 5' 1" (1.549 m)   Wt 93 kg (205 lb 0.4 oz)   LMP 07/25/2013   BMI 38.74 kg/m²       Physical Exam:    GENERAL: Well appearing, in no acute distress, alert and oriented x3.  PSYCH:  Mood and affect appropriate.  SKIN: Skin color, texture, turgor normal, no rashes or lesions.  HEAD/FACE:  Normocephalic, atraumatic. Cranial nerves grossly intact.    CV: RRR with palpation of the radial artery.  PULM: No evidence of respiratory difficulty, symmetric chest rise.  GI:  Soft and non-tender.    BACK: Straight leg raising in the sitting and supine positions is negative to radicular pain.  pain to palpation over the facet joints of the lumbar spine or spinous processes. Normal range of motion without pain reproduction.  EXTREMITIES: Peripheral joint ROM is full and pain free without obvious instability or laxity in all four extremities. No deformities, edema, or skin discoloration. Good capillary refill.  MUSCULOSKELETAL: Able to stand on heels & toes.   Hip provocative " maneuvers are negative.  There is no pain with palpation over the sacroiliac joints bilaterally.  Gaenslen's, Distraction/Compression and  FABERs test is negative.  Facet loading test is positive bilaterally.   Bilateral upper and lower extremity strength is normal and symmetric.  No atrophy or tone abnormalities are noted.    RIGHT Lower extremity: Hip flexion 5/5, Hip Abduction 5/5, Hip Adduction 5/5, Knee extension 5/5, Knee flexion 5/5, Ankle dorsiflexion5/5, Extensor hallucis longus 5/5, Ankle plantarflexion 5/5  LEFT Lower extremity:  Hip flexion 5/5, Hip Abduction 5/5,Hip Adduction 5/5, Knee extension 5/5, Knee flexion 5/5, Ankle dorsiflexion 5/5, Extensor hallucis longus 5/5, Ankle plantarflexion 5/5  -Normal testing knee (patellar) jerk and ankle (achilles) jerk    NEURO: Bilateral upper and lower extremity coordination and muscle stretch reflexes are physiologic and symmetric. No loss of sensation is noted.  GAIT: normal.    Imagin11    MRI Cervical Spine Without Contrast    Narrative  DATE OF EXAM: Santy  3 2011    [facility]   0014  -  MRI SPINE CERVICAL WITHOUT CONT:  [id number]    CLINICAL HISTORY:   723.1  248.2 \    ICD 9 CODE(S):   ()    CPT 4 CODE(S)/MODIFIER(S):   ()    RESULTS:  Examination: MRI SPINE CERVICAL WITHOUT CONT   2011 09:56:00 AM    Indication: 723.1  248.2 neck pain decreased strength in bilateral arms    Comparison: None    Technical: Multiplanar multisequence MR images of the cervical spine were  completed without administration of contrast.    Findings:    The visualized contents of the posterior cranial fossa are normal. Bone  marrow signal characteristics are normal. The surrounding soft tissues  are normal. There is straightening of the normal lordotic curvature of  the cervical spine which may be related to muscle spasm, position, or  degenerative changes.    At C2-C3, there is a minimal disc bulge.    At C3-C4, disc is normal. There is mild right facet  hypertrophy.    At C4-C5, there is a mild diffuse disc bulge with a small superimposed  central broad-based disc protrusion with mild contact upon the ventral  spinal cord.    At C5-C6, there is a mild diffuse disc bulge.    At C6-C7, there is a mild diffuse disc bulge. The central spinal canal  and foramen are patent.    At C7-T1, disc is normal.        02/06/25    X-Ray Lumbar Spine Ap And Lateral    Narrative  EXAM:  XR LUMBAR SPINE AP AND LATERAL    INDICATION:  Pain    COMPARISON: None    TECHNIQUE: Lumbar spine 3 views    FINDINGS:  The vertebral body heights are normal.  The alignment is normal.   Mild disc space narrowing L5-S1.  Facet hypertrophy L3-S1.  No evidence of a pars defect.    Impression  No radiographic evidence of fracture or subluxation.    Degenerative disc disease centered at L5-S1.  Prominent bilateral facet hypertrophy.            ASSESSMENT: 59 y.o. year old female with     1. Lumbar facet arthropathy  diazePAM (VALIUM) 5 MG tablet      2. Lumbar radiculopathy  Ambulatory referral/consult to [facility]    Ambulatory Referral/Consult to Physical Therapy    diazePAM (VALIUM) 5 MG tablet      3. Degeneration of intervertebral disc of lumbar region with discogenic back pain and lower extremity pain  Ambulatory referral/consult to [facility] Spine Clinic    Ambulatory Referral/Consult to Physical Therapy    diazePAM (VALIUM) 5 MG tablet      4. Lumbar radiculopathy, chronic  MRI Lumbar Spine Without Contrast    MRI Lumbar Spine Without Contrast            PLAN:   - Interventions:  We have discussed considering bilateral L3-5 lumbar medial branch block diagnostic to address axial back pain versus left-sided transforaminal epidural steroid injection to address lumbar radiculopathy.  We will 1st review lumbar MRI. Explained the risks and benefits of the procedure in detail with the patient today in clinic along with alternative treatment options    - Anticoagulation use: No no  anticoagulation     report:  Reviewed and consistent with medication use as prescribed.    - Medications:  -We will start Cymbalta (duloxetine) 30 mg once daily.  We have discussed potential common side effects of duloxetine including nausea, diarrhea/constipation, fatigue, drowsiness or dry mouth.  Patient expresses understanding.    -We have discussed temporarily starting an anti spasmodic, Robaxin 500 mg twice daily  to see if this helps with myofascial pain.  We have discussed potential deleterious side effects associated with this medication including  dizziness, drowsiness, stomach upset, nausea vomiting or blurred vision.  Patient expresses understanding.    - Therapy:   We discussed initiating physical therapy to help manage the patient/s painful condition. The patient was counseled that muscle strengthening will improve the long term prognosis in regards to pain and may also help increase range of motion and mobility. They were told that one of the goals of physical therapy is that they learn how to do the exercises so that they can do them independently at home daily upon completion. The patient's questions were answered and they were agreeable to this course. A referral for INT physical therapy was provided to the patient.      - Imaging: Reviewed available imaging(x-ray lumbar spine) with patient and answered any questions they had regarding study.  Open MRI lumbar spine to better evaluate pain.  Valium sent for procedural anxiolysis    - Follow up visit: return to clinic in 4-6 weeks to review MRI.  Virtual visit      The above plan and management options were discussed at length with patient. Patient is in agreement with the above and verbalized understanding.    - I discussed the goals of interventional chronic pain management with the patient on today's visit. We discussed a multimodal and systematic approach to pain.  This includes diagnostic and therapeutic injections, adjuvant pharmacologic  treatment, physical therapy, and at times psychiatry.  I emphasized the importance of regular exercise, core strengthening and stretching, diet and weight loss as a cornerstone of long-term pain management.    - This condition does not require this patient to take time off of work, and the primary goal of our Pain Management services is to improve the patient's functional capacity.  - Patient Questions: Answered all of the patient's questions regarding diagnoses, therapy, treatment and next steps        Nathalie Jo MD  Interventional Pain Management  [facility]    Disclaimer:  This note was prepared using voice recognition system and is likely to have sound alike errors that may have been overlooked even after proof reading.  Please call me with any questions

## 2025-04-25 ENCOUNTER — TELEPHONE (OUTPATIENT)
Dept: PAIN MEDICINE | Facility: CLINIC | Age: 60
End: 2025-04-25
Payer: COMMERCIAL

## 2025-04-28 ENCOUNTER — OFFICE VISIT (OUTPATIENT)
Dept: PAIN MEDICINE | Facility: CLINIC | Age: 60
End: 2025-04-28
Payer: COMMERCIAL

## 2025-04-28 VITALS
HEIGHT: 61 IN | RESPIRATION RATE: 17 BRPM | SYSTOLIC BLOOD PRESSURE: 149 MMHG | BODY MASS INDEX: 38.71 KG/M2 | WEIGHT: 205 LBS | HEART RATE: 64 BPM | DIASTOLIC BLOOD PRESSURE: 83 MMHG

## 2025-04-28 DIAGNOSIS — M54.16 LUMBAR RADICULOPATHY: ICD-10-CM

## 2025-04-28 DIAGNOSIS — M47.816 LUMBAR FACET ARTHROPATHY: Primary | ICD-10-CM

## 2025-04-28 DIAGNOSIS — M54.16 LUMBAR RADICULOPATHY, CHRONIC: ICD-10-CM

## 2025-04-28 DIAGNOSIS — M51.362 DEGENERATION OF INTERVERTEBRAL DISC OF LUMBAR REGION WITH DISCOGENIC BACK PAIN AND LOWER EXTREMITY PAIN: ICD-10-CM

## 2025-04-28 PROCEDURE — 3077F SYST BP >= 140 MM HG: CPT | Mod: CPTII,S$GLB,, | Performed by: ANESTHESIOLOGY

## 2025-04-28 PROCEDURE — 1159F MED LIST DOCD IN RCRD: CPT | Mod: CPTII,S$GLB,, | Performed by: ANESTHESIOLOGY

## 2025-04-28 PROCEDURE — 3044F HG A1C LEVEL LT 7.0%: CPT | Mod: CPTII,S$GLB,, | Performed by: ANESTHESIOLOGY

## 2025-04-28 PROCEDURE — 3008F BODY MASS INDEX DOCD: CPT | Mod: CPTII,S$GLB,, | Performed by: ANESTHESIOLOGY

## 2025-04-28 PROCEDURE — 99999 PR PBB SHADOW E&M-EST. PATIENT-LVL V: CPT | Mod: PBBFAC,,, | Performed by: ANESTHESIOLOGY

## 2025-04-28 PROCEDURE — 1160F RVW MEDS BY RX/DR IN RCRD: CPT | Mod: CPTII,S$GLB,, | Performed by: ANESTHESIOLOGY

## 2025-04-28 PROCEDURE — 3079F DIAST BP 80-89 MM HG: CPT | Mod: CPTII,S$GLB,, | Performed by: ANESTHESIOLOGY

## 2025-04-28 PROCEDURE — 99204 OFFICE O/P NEW MOD 45 MIN: CPT | Mod: S$GLB,,, | Performed by: ANESTHESIOLOGY

## 2025-04-28 RX ORDER — METHOCARBAMOL 500 MG/1
500 TABLET, FILM COATED ORAL 2 TIMES DAILY PRN
Qty: 60 TABLET | Refills: 0 | Status: SHIPPED | OUTPATIENT
Start: 2025-04-28

## 2025-04-28 RX ORDER — DIAZEPAM 5 MG/1
5 TABLET ORAL ONCE
Qty: 1 TABLET | Refills: 0 | Status: SHIPPED | OUTPATIENT
Start: 2025-04-28 | End: 2025-04-28

## 2025-04-28 RX ORDER — GABAPENTIN 100 MG/1
100 CAPSULE ORAL 3 TIMES DAILY
COMMUNITY

## 2025-04-28 RX ORDER — DULOXETIN HYDROCHLORIDE 30 MG/1
30 CAPSULE, DELAYED RELEASE ORAL DAILY
Qty: 30 CAPSULE | Refills: 0 | Status: SHIPPED | OUTPATIENT
Start: 2025-04-28 | End: 2025-05-28

## 2025-07-11 ENCOUNTER — TELEPHONE (OUTPATIENT)
Dept: PAIN MEDICINE | Facility: CLINIC | Age: 60
End: 2025-07-11
Payer: COMMERCIAL